# Patient Record
Sex: FEMALE | Employment: OTHER | URBAN - METROPOLITAN AREA
[De-identification: names, ages, dates, MRNs, and addresses within clinical notes are randomized per-mention and may not be internally consistent; named-entity substitution may affect disease eponyms.]

---

## 2019-02-21 ENCOUNTER — OFFICE VISIT (OUTPATIENT)
Dept: NEUROLOGY | Age: 79
End: 2019-02-21

## 2019-02-21 VITALS
DIASTOLIC BLOOD PRESSURE: 77 MMHG | RESPIRATION RATE: 18 BRPM | HEIGHT: 62 IN | OXYGEN SATURATION: 98 % | BODY MASS INDEX: 34.04 KG/M2 | WEIGHT: 185 LBS | SYSTOLIC BLOOD PRESSURE: 122 MMHG | HEART RATE: 74 BPM

## 2019-02-21 DIAGNOSIS — G62.9 NEUROPATHY: Primary | ICD-10-CM

## 2019-02-21 RX ORDER — DULOXETIN HYDROCHLORIDE 60 MG/1
60 CAPSULE, DELAYED RELEASE ORAL DAILY
COMMUNITY

## 2019-02-21 RX ORDER — LORATADINE 10 MG/1
10 TABLET ORAL
COMMUNITY

## 2019-02-21 RX ORDER — CALCIUM CARBONATE 600 MG
600 TABLET ORAL 2 TIMES DAILY
COMMUNITY

## 2019-02-21 RX ORDER — GUAIFENESIN 100 MG/5ML
81 LIQUID (ML) ORAL DAILY
COMMUNITY

## 2019-02-21 RX ORDER — GABAPENTIN 400 MG/1
400 CAPSULE ORAL 3 TIMES DAILY
COMMUNITY
End: 2019-07-22 | Stop reason: SDUPTHER

## 2019-02-21 RX ORDER — RANITIDINE 150 MG/1
150 TABLET, FILM COATED ORAL 2 TIMES DAILY
COMMUNITY

## 2019-02-21 NOTE — PROGRESS NOTES
Neurology Consult Subjective:  
  
Erick Gonsalves is a 66 y.o. female who comes in today with the following history. 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 We will attempt to get those notes as to diagnosis testing and conclusions drawn. Apparently has had numbness and pain intermittently in the feet for the better part of 10 years. Says she does not have diabetes. Vividly recalls previous EMG and nerve conduction interrogation. The sensory changes of the feet apparently come and go and she has no real history of falling per se and balance is otherwise okay. Is status post bilateral total knee replacements. Family history negative for neuropathy. Does not smoke or consume alcohol. No history of chemotherapy for cancer etc... Bowel and bladder function reveals chronic urgency of bladder. Says she has no problem with perspiration during the hot humid days of summer. Bulbar function intact. Special sensory function intact and does wears glasses. No peripheral claudication history. No low back pain history either. Her med list includes gabapentin 400 tid and cymbalta 60 mgm/day. No history of immunocompromised state and beyond osteoarthritis, no connective tissue issues. Current Outpatient Medications Medication Sig Dispense Refill  gabapentin (NEURONTIN) 400 mg capsule Take 400 mg by mouth three (3) times daily.  DULoxetine (CYMBALTA) 60 mg capsule Take 60 mg by mouth daily.  vit C/vit E/lutein/min/omega-3 (OCUVITE PO) Take  by mouth.  raNITIdine (ZANTAC) 150 mg tablet Take 150 mg by mouth two (2) times a day.  loratadine (CLARITIN) 10 mg tablet Take 10 mg by mouth.  calcium carbonate (CALTREX) 600 mg calcium (1,500 mg) tablet Take 600 mg by mouth two (2) times a day.  omega-3 fatty acids (FISH OIL CONCENTRATE PO) Take  by mouth.  aspirin 81 mg chewable tablet Take 81 mg by mouth daily. No Known Allergies Past Medical History:  
Diagnosis Date  Arthritis  Incontinence in female  Snoring Past Surgical History:  
Procedure Laterality Date  HX KNEE REPLACEMENT Social History Socioeconomic History  Marital status: UNKNOWN Spouse name: Not on file  Number of children: Not on file  Years of education: Not on file  Highest education level: Not on file Social Needs  Financial resource strain: Not on file  Food insecurity - worry: Not on file  Food insecurity - inability: Not on file  Transportation needs - medical: Not on file  Transportation needs - non-medical: Not on file Occupational History  Not on file Tobacco Use  Smoking status: Never Smoker  Smokeless tobacco: Never Used Substance and Sexual Activity  Alcohol use: No  
  Frequency: Never  Drug use: No  
 Sexual activity: Not on file Other Topics Concern  Not on file Social History Narrative  Not on file No family history on file. Visit Vitals /77 Pulse 74 Resp 18 Ht 5' 2\" (1.575 m) Wt 83.9 kg (185 lb) SpO2 98% BMI 33.84 kg/m² Review of Systems: A comprehensive review of systems was negative except for that written in the HPI. Neuro Exam:  
 
Appearance: The patient is well developed, well nourished, provides a coherent history and is in no acute distress. Mental Status: Oriented to time, place and person. Mood and affect appropriate. Cranial Nerves:   Intact visual fields. Fundi are benign. EVARISTO, EOM's full, no nystagmus, no ptosis. Facial sensation is normal. Corneal reflexes are intact. Facial movement is symmetric. Hearing is normal bilaterally. Palate is midline with normal sternocleidomastoid and trapezius muscles are normal. Tongue is midline. Motor:  5/5 strength in upper and lower proximal and distal muscles. Normal bulk and tone. No fasciculations. Reflexes:   Deep tendon reflexes 0- trace/4 and symmetrical.  
Sensory:    Diminished distally to touch, pinprick and vibration. Position sense intact Gait:  Normal gait. Tremor:   No tremor noted. Cerebellar:  No cerebellar signs present. Neurovascular:  Normal heart sounds and regular rhythm, peripheral pulses intact, and no carotid bruits. Toes downgoing no clonus no Leonardo's. Romberg negative. Assessment:  
Idiopathic polyneuropathy? We will need to get information from previous neurologist as to investigation and conclusions and treatment phases. Continue on the gabapentin and Cymbalta is a very reasonable drug that has been used for neuropathic pain as well. Revisit 6 months. Plan:  
Revisit 6 months.  
Signed by :  Miguelito Doe MD

## 2019-07-19 ENCOUNTER — TELEPHONE (OUTPATIENT)
Dept: NEUROLOGY | Age: 79
End: 2019-07-19

## 2019-07-19 NOTE — TELEPHONE ENCOUNTER
----- Message from Marianne Evans sent at 7/19/2019  3:11 PM EDT -----  Regarding: Dr. Sammie Ho from [address] of [employer] requesting recent appt notes faxed to [phone]. Best contact [phone].

## 2019-07-22 DIAGNOSIS — M79.2 NEUROPATHIC PAIN: ICD-10-CM

## 2019-07-22 DIAGNOSIS — M79.2 NEUROPATHIC PAIN: Primary | ICD-10-CM

## 2019-07-22 RX ORDER — GABAPENTIN 400 MG/1
400 CAPSULE ORAL 3 TIMES DAILY
Qty: 90 CAP | Refills: 1 | Status: SHIPPED | OUTPATIENT
Start: 2019-07-22 | End: 2019-09-27 | Stop reason: SDUPTHER

## 2019-07-22 RX ORDER — GABAPENTIN 400 MG/1
400 CAPSULE ORAL 3 TIMES DAILY
Qty: 90 CAP | Refills: 1 | Status: CANCELLED | OUTPATIENT
Start: 2019-07-22

## 2019-07-23 ENCOUNTER — TELEPHONE (OUTPATIENT)
Dept: NEUROLOGY | Age: 79
End: 2019-07-23

## 2019-07-23 NOTE — TELEPHONE ENCOUNTER
----- Message from Leeanna Shipley sent at 7/23/2019  8:44 AM EDT -----  Regarding: Dr. Joanne Villatoro  Pt requesting refill for Rx \"Gabapentin 400 mg\" sent to [facility] pharmacy on file. Best contact [phone].

## 2019-09-23 DIAGNOSIS — M79.2 NEUROPATHIC PAIN: ICD-10-CM

## 2019-09-23 RX ORDER — GABAPENTIN 400 MG/1
CAPSULE ORAL
Qty: 90 CAP | Refills: 1 | OUTPATIENT
Start: 2019-09-23

## 2019-09-27 ENCOUNTER — TELEPHONE (OUTPATIENT)
Dept: NEUROLOGY | Age: 79
End: 2019-09-27

## 2019-09-27 DIAGNOSIS — M79.2 NEUROPATHIC PAIN: ICD-10-CM

## 2019-09-27 RX ORDER — GABAPENTIN 400 MG/1
400 CAPSULE ORAL 3 TIMES DAILY
Qty: 90 CAP | Refills: 1 | OUTPATIENT
Start: 2019-09-27 | End: 2019-11-14 | Stop reason: SDUPTHER

## 2019-09-27 NOTE — TELEPHONE ENCOUNTER
----- Message from [facility] sent at 9/27/2019 11:05 AM EDT -----  Regarding: Dr. Camille Fajardo  General Message/Vendor Calls    Caller's first and last name:uEn Ndiaye      Reason for call:Rx request      Callback required yes/no and why:yes      Best contact number(s): [phone]      Details to clarify the request: Pt would like to know if her Rx(Gabapentin) has been called to [facility]([phone]) requested over a wk a go. Pt stated she is out of the medication.       [facility]

## 2019-09-27 NOTE — TELEPHONE ENCOUNTER
Gabapentin refill not recv'd. Order placed for gabapentin 400 mg, # 90, PO, per Verbal Order from Georgina Knight on 9/27/2019 due to neuropathy.

## 2019-09-27 NOTE — TELEPHONE ENCOUNTER
----- Message from [facility] sent at 9/27/2019  8:03 AM EDT -----  Regarding: Dr Michele Malloy refill  Medication Refill    Caller (if not patient): pt      Relationship of caller (if not patient):      Best contact number(s): [phone]      Name of medication and dosage if known: gabapentin      Is patient out of this medication (yes/no): yes      Pharmacy name: [facility]    Pharmacy listed in chart? (yes/no):yes  Pharmacy phone number: [phone]      Details to clarify the request: the original request was put in on Monday but a response hasnt been received       Jace Mustafa

## 2019-09-27 NOTE — TELEPHONE ENCOUNTER
----- Message from [facility] sent at 9/27/2019  8:03 AM EDT -----  Regarding: Dr Kenton Ponce refill  Medication Refill    Caller (if not patient): pt      Relationship of caller (if not patient):      Best contact number(s): [phone]      Name of medication and dosage if known: gabapentin      Is patient out of this medication (yes/no): yes      Pharmacy name: [facility]    Pharmacy listed in chart? (yes/no):yes  Pharmacy phone number: [phone]      Details to clarify the request: the original request was put in on Monday but a response hasnt been received       Jace Mustafa

## 2019-11-14 ENCOUNTER — OFFICE VISIT (OUTPATIENT)
Dept: NEUROLOGY | Age: 79
End: 2019-11-14

## 2019-11-14 VITALS
BODY MASS INDEX: 34.04 KG/M2 | OXYGEN SATURATION: 98 % | HEART RATE: 81 BPM | DIASTOLIC BLOOD PRESSURE: 83 MMHG | HEIGHT: 62 IN | WEIGHT: 185 LBS | RESPIRATION RATE: 18 BRPM | SYSTOLIC BLOOD PRESSURE: 132 MMHG

## 2019-11-14 DIAGNOSIS — G62.9 NEUROPATHY: Primary | ICD-10-CM

## 2019-11-14 DIAGNOSIS — M79.2 NEUROPATHIC PAIN: ICD-10-CM

## 2019-11-14 RX ORDER — GABAPENTIN 400 MG/1
400 CAPSULE ORAL 3 TIMES DAILY
Qty: 90 CAP | Refills: 5 | Status: SHIPPED | OUTPATIENT
Start: 2019-11-14 | End: 2020-05-19

## 2019-11-14 NOTE — LETTER
11/14/19 Patient: Iris Bay YOB: 1940 Date of Visit: 11/14/2019 Renaee Barthel, NP 
[address] [facility] [address] [facility] [address] VIA Facsimile: [phone] Dear Renaee Barthel, NP, Thank you for referring Ms. Iris Bay to [facility] for evaluation. My notes for this consultation are attached. If you have questions, please do not hesitate to call me. I look forward to following your patient along with you.  
 
 
Sincerely, 
 
Peg Nguyen MD

## 2019-11-14 NOTE — PATIENT INSTRUCTIONS
[address] [facility]   Statement to Patients  April 1, 2014      In an effort to ensure the large volume of patient prescription refills is processed in the most efficient and expeditious manner, we are asking our patients to assist us by calling your Pharmacy for all prescription refills, this will include also your  Mail Order Pharmacy. The pharmacy will contact our office electronically to continue the refill process. Please do not wait until the last minute to call your pharmacy. We need at least 48 hours (2days) to fill prescriptions. We also encourage you to call your pharmacy before going to  your prescription to make sure it is ready. With regard to controlled substance prescription refill requests (narcotic refills) that need to be picked up at our office, we ask your cooperation by providing us with at least 72 hours (3days) notice that you will need a refill. We will not refill narcotic prescription refill requests after 4:00pm on any weekday, Monday through Thursday, or after 2:00pm on Fridays, or on the weekends. We encourage everyone to explore another way of getting your prescription refill request processed using [employer], our patient web portal through our electronic medical record system. [employer] is an efficient and effective way to communicate your medication request directly to the office and  downloadable as an emily on your smart phone . [employer] also features a review functionality that allows you to view your medication list as well as leave messages for your physician. Are you ready to get connected? If so please review the attatched instructions or speak to any of our staff to get you set up right away! Thank you so much for your cooperation. Should you have any questions please contact our Practice Administrator.     The Physicians and Staff,  [facility] [address]  What is a living will?    A living will is a legal form you use to write down the kind of care you want at the end of your life. It is used by the health professionals who will treat you if you aren't able to decide for yourself. If you put your wishes in writing, your loved ones and others will know what kind of care you want. They won't need to guess. This can ease your mind and be helpful to others. A living will is not the same as an estate or property will. An estate will explains what you want to happen with your money and property after you die. Is a living will a legal document? A living will is a legal document. Each state has its own laws about living leonard. If you move to another state, make sure that your living will is legal in the state where you now live. Or you might use a universal form that has been approved by many states. This kind of form can sometimes be completed and stored online. Your electronic copy will then be available wherever you have a connection to the Internet. In most cases, doctors will respect your wishes even if you have a form from a different state. · You don't need an  to complete a living will. But legal advice can be helpful if your state's laws are unclear, your health history is complicated, or your family can't agree on what should be in your living will. · You can change your living will at any time. Some people find that their wishes about end-of-life care change as their health changes. · In addition to making a living will, think about completing a medical power of  form. This form lets you name the person you want to make end-of-life treatment decisions for you (your \"health care agent\") if you're not able to. Many hospitals and nursing homes will give you the forms you need to complete a living will and a medical power of . · Your living will is used only if you can't make or communicate decisions for yourself anymore.  If you become able to make decisions again, you can accept or refuse any treatment, no matter what you wrote in your living will. · Your state may offer an online registry. This is a place where you can store your living will online so the doctors and nurses who need to treat you can find it right away. What should you think about when creating a living will? Talk about your end-of-life wishes with your family members and your doctor. Let them know what you want. That way the people making decisions for you won't be surprised by your choices. Think about these questions as you make your living will:  · Do you know enough about life support methods that might be used? If not, talk to your doctor so you know what might be done if you can't breathe on your own, your heart stops, or you're unable to swallow. · What things would you still want to be able to do after you receive life-support methods? Would you want to be able to walk? To speak? To eat on your own? To live without the help of machines? · If you have a choice, where do you want to be cared for? In your home? At a hospital or nursing home? · Do you want certain Roman Catholic practices performed if you become very ill? · If you have a choice at the end of your life, where would you prefer to die? At home? In a hospital or nursing home? Somewhere else? · Would you prefer to be buried or cremated? · Do you want your organs to be donated after you die? What should you do with your living will? · Make sure that your family members and your health care agent have copies of your living will. · Give your doctor a copy of your living will to keep in your medical record. If you have more than one doctor, make sure that each one has a copy. · You may want to put a copy of your living will where it can be easily found. Where can you learn more? Go to http://tip-kosta.info/. Enter S776 in the search box to learn more about \"Learning About Living David. \"  Current as of: April 1, 2019  Content Version: 12.2  © 2852-3414 [employer]. Care instructions adapted under license by [employer] (which disclaims liability or warranty for this information). If you have questions about a medical condition or this instruction, always ask your healthcare professional. [address] any warranty or liability for your use of this information. This was a straightforward revisit on this patient and basically has no complaints. Pain is being covered with the gabapentin and will renew that on this visit. Went over the drug is a controlled substance since this summer deemed by the FDA. Revisit in 6 months.

## 2019-11-14 NOTE — PROGRESS NOTES
Neurology Consult      Subjective:      Iris Bay is a 66 y.o. female Who comes in today on a revisit of idiopathic painful polyneuropathy. Is on gabapentin 400 mg 3 times daily. This was renewed by request.  This past summer patient became aware of its controlled drug status and said she actually went a month without the medicine because of that she got up in its scheduling. Does not site any new difficulties and is very happy and self-sufficient. There is no history of falls although sometimes if she is not attentive she might have a slight near miss situation- to a near fall -if she is not reactive. Turned in her regular baseline exam with uniform suppression of reflexes in length dependent sensory changes. I did get a chance to review the requested neurology notes from Dr. Gala [facility] practice. It basically endorses, by process of elimination, that she has an idiopathic painful polyneuropathy. Will suggest a revisit in 6 months. Current Outpatient Medications   Medication Sig Dispense Refill    gabapentin (NEURONTIN) 400 mg capsule Take 1 Cap by mouth three (3) times daily. Max Daily Amount: 1,200 mg. Indications: Neuropathic Pain 90 Cap 5    DULoxetine (CYMBALTA) 60 mg capsule Take 60 mg by mouth daily.  vit C/vit E/lutein/min/omega-3 (OCUVITE PO) Take  by mouth.  raNITIdine (ZANTAC) 150 mg tablet Take 150 mg by mouth two (2) times a day.  loratadine (CLARITIN) 10 mg tablet Take 10 mg by mouth.  calcium carbonate (CALTREX) 600 mg calcium (1,500 mg) tablet Take 600 mg by mouth two (2) times a day.  omega-3 fatty acids (FISH OIL CONCENTRATE PO) Take  by mouth.  aspirin 81 mg chewable tablet Take 81 mg by mouth daily.         No Known Allergies  Past Medical History:   Diagnosis Date    Arthritis     Incontinence in female     Snoring       Past Surgical History:   Procedure Laterality Date    HX KNEE REPLACEMENT        Social History     Socioeconomic History    Marital status: UNKNOWN     Spouse name: Not on file    Number of children: Not on file    Years of education: Not on file    Highest education level: Not on file   Occupational History    Not on file   Social Needs    Financial resource strain: Not on file    Food insecurity:     Worry: Not on file     Inability: Not on file    Transportation needs:     Medical: Not on file     Non-medical: Not on file   Tobacco Use    Smoking status: Never Smoker    Smokeless tobacco: Never Used   Substance and Sexual Activity    Alcohol use: No     Frequency: Never    Drug use: No    Sexual activity: Not on file   Lifestyle    Physical activity:     Days per week: Not on file     Minutes per session: Not on file    Stress: Not on file   Relationships    Social connections:     Talks on phone: Not on file     Gets together: Not on file     Attends Adventism service: Not on file     Active member of club or organization: Not on file     Attends meetings of clubs or organizations: Not on file     Relationship status: Not on file    Intimate partner violence:     Fear of current or ex partner: Not on file     Emotionally abused: Not on file     Physically abused: Not on file     Forced sexual activity: Not on file   Other Topics Concern    Not on file   Social History Narrative    Not on file      No family history on file. Visit Vitals  /83   Pulse 81   Resp 18   Ht 5' 2\" (1.575 m)   Wt 83.9 kg (185 lb)   SpO2 98%   BMI 33.84 kg/m²        Review of Systems:   A comprehensive review of systems was negative except for that written in the HPI. Neuro Exam:     Appearance: The patient is well developed, well nourished, provides a coherent history and is in no acute distress. Mental Status: Oriented to time, place and person. Mood and affect appropriate. Cranial Nerves:   Intact visual fields. Fundi are benign. EVARISTO, EOM's full, no nystagmus, no ptosis. Facial sensation is normal. Corneal reflexes are intact. Facial movement is symmetric. Hearing is normal bilaterally. Palate is midline with normal sternocleidomastoid and trapezius muscles are normal. Tongue is midline. Motor:  5/5 strength in upper and lower proximal and distal muscles. Normal bulk and tone. No fasciculations. Reflexes:   Deep tendon reflexes 0-trace/4 and symmetrical.   Sensory:    Diminished distally to touch, pinprick and vibration. Gait:  Normal gait. Tremor:   No tremor noted. Cerebellar:  No cerebellar signs present. Neurovascular:  Normal heart sounds and regular rhythm, peripheral pulses intact, and no carotid bruits. Assessment:   Idiopathic painful polyneuropathy. Patient is doing well and has no complaints. Will simply renew her gabapentin 400 mg 3 times daily. Please see above dictation. Does not reference any new difficulties and we will see her back in 6 months. Seems to be functioning up to her own expectations. Plan:   Revisit 6 months.   Signed by :  Divine Wagoner MD

## 2020-05-18 DIAGNOSIS — M79.2 NEUROPATHIC PAIN: ICD-10-CM

## 2020-05-19 RX ORDER — GABAPENTIN 400 MG/1
CAPSULE ORAL
Qty: 90 CAP | Refills: 0 | Status: SHIPPED | OUTPATIENT
Start: 2020-05-19 | End: 2020-06-25

## 2020-06-25 DIAGNOSIS — M79.2 NEUROPATHIC PAIN: ICD-10-CM

## 2020-06-25 RX ORDER — GABAPENTIN 400 MG/1
CAPSULE ORAL
Qty: 90 CAP | Refills: 0 | Status: SHIPPED | OUTPATIENT
Start: 2020-06-25 | End: 2020-07-30

## 2020-09-28 DIAGNOSIS — M79.2 NEUROPATHIC PAIN: ICD-10-CM

## 2020-10-05 RX ORDER — GABAPENTIN 400 MG/1
CAPSULE ORAL
Qty: 90 CAP | Refills: 0 | Status: SHIPPED | OUTPATIENT
Start: 2020-10-05 | End: 2020-10-15 | Stop reason: SDUPTHER

## 2020-10-06 ENCOUNTER — TELEPHONE (OUTPATIENT)
Dept: NEUROLOGY | Age: 80
End: 2020-10-06

## 2020-10-06 NOTE — TELEPHONE ENCOUNTER
----- Message from Jhoana Quick sent at 10/5/2020  4:26 PM EDT -----  Regarding: Dr. Tomy Alford General Message/Vendor Calls    Caller's first and last name:      Reason for call: Regarding her refill request for Rx Gabapentin 400mg, stated she is completely out, been out since last wk.       Call back required yes/no and why: Yes       Best contact number(s): [phone]      Details to clarify the request:       Jhoana Quick

## 2020-10-15 ENCOUNTER — OFFICE VISIT (OUTPATIENT)
Dept: NEUROLOGY | Age: 80
End: 2020-10-15
Payer: MEDICARE

## 2020-10-15 VITALS
SYSTOLIC BLOOD PRESSURE: 131 MMHG | OXYGEN SATURATION: 97 % | BODY MASS INDEX: 33.84 KG/M2 | RESPIRATION RATE: 18 BRPM | TEMPERATURE: 96.8 F | HEART RATE: 76 BPM | DIASTOLIC BLOOD PRESSURE: 71 MMHG | HEIGHT: 62 IN

## 2020-10-15 DIAGNOSIS — M79.2 NEUROPATHIC PAIN: ICD-10-CM

## 2020-10-15 DIAGNOSIS — G62.9 NEUROPATHY: Primary | ICD-10-CM

## 2020-10-15 PROCEDURE — 99213 OFFICE O/P EST LOW 20 MIN: CPT | Performed by: SPECIALIST

## 2020-10-15 RX ORDER — GABAPENTIN 400 MG/1
CAPSULE ORAL
Qty: 90 CAP | Refills: 5 | Status: SHIPPED | OUTPATIENT
Start: 2020-10-15 | End: 2020-11-05 | Stop reason: SDUPTHER

## 2020-10-15 NOTE — PROGRESS NOTES
Neurology Consult      Subjective:      Jed Haque is a 78 y.o. female who comes in today with history of idiopathic painful neuropathy. Takes gabapentin for 100 mg 3 times daily and that will be renewed today. A way of recall she is also on Cymbalta 60 mg daily. That medicine probably helps the cause as well. Does not reference any new medical or surgical history and will suggest revisit in 6 months here. I thought her exam was strictly baseline. Current Outpatient Medications   Medication Sig Dispense Refill    gabapentin (NEURONTIN) 400 mg capsule TAKE 1 CAPSULE BY MOUTH THREE TIMES A DAY 90 Cap 0    DULoxetine (CYMBALTA) 60 mg capsule Take 60 mg by mouth daily.  vit C/vit E/lutein/min/omega-3 (OCUVITE PO) Take  by mouth.  loratadine (CLARITIN) 10 mg tablet Take 10 mg by mouth.  calcium carbonate (CALTREX) 600 mg calcium (1,500 mg) tablet Take 600 mg by mouth two (2) times a day.  omega-3 fatty acids (FISH OIL CONCENTRATE PO) Take  by mouth.  aspirin 81 mg chewable tablet Take 81 mg by mouth daily.  raNITIdine (ZANTAC) 150 mg tablet Take 150 mg by mouth two (2) times a day. No Known Allergies  Past Medical History:   Diagnosis Date    Arthritis     Incontinence in female     Snoring       Past Surgical History:   Procedure Laterality Date    HX KNEE REPLACEMENT        Social History     Socioeconomic History    Marital status: UNKNOWN     Spouse name: Not on file    Number of children: Not on file    Years of education: Not on file    Highest education level: Not on file   Occupational History    Not on file   Social Needs    Financial resource strain: Not on file    Food insecurity     Worry: Not on file     Inability: Not on file    Transportation needs     Medical: Not on file     Non-medical: Not on file   Tobacco Use    Smoking status: Never Smoker    Smokeless tobacco: Never Used   Substance and Sexual Activity    Alcohol use:  No Frequency: Never    Drug use: No    Sexual activity: Not on file   Lifestyle    Physical activity     Days per week: Not on file     Minutes per session: Not on file    Stress: Not on file   Relationships    Social connections     Talks on phone: Not on file     Gets together: Not on file     Attends Quaker service: Not on file     Active member of club or organization: Not on file     Attends meetings of clubs or organizations: Not on file     Relationship status: Not on file    Intimate partner violence     Fear of current or ex partner: Not on file     Emotionally abused: Not on file     Physically abused: Not on file     Forced sexual activity: Not on file   Other Topics Concern    Not on file   Social History Narrative    Not on file      No family history on file. Visit Vitals  /71   Pulse 76   Temp 96.8 °F (36 °C) (Temporal)   Resp 18   Ht 5' 2\" (1.575 m)   SpO2 97%   BMI 33.84 kg/m²        Review of Systems:   A comprehensive review of systems was negative except for that written in the HPI. Neuro Exam:     Appearance: The patient is well developed, well nourished, provides a coherent history and is in no acute distress. Mental Status: Oriented to time, place and person. Mood and affect appropriate. Cranial Nerves:   Intact visual fields. Fundi are benign. EVARISTO, EOM's full, no nystagmus, no ptosis. Facial sensation is normal. Corneal reflexes are intact. Facial movement is symmetric. Hearing is normal bilaterally. Palate is midline with normal sternocleidomastoid and trapezius muscles are normal. Tongue is midline. Motor:  5/5 strength in upper and lower proximal and distal muscles. Normal bulk and tone. No fasciculations. Reflexes:   Deep tendon reflexes 0-2+/4 and symmetrical.  Has bilateral artificial knees and that and the ankle jerks are unobtainable   Sensory:    Diminished distally to touch, pinprick and vibration. Gait:  Normal gait. Tremor:   No tremor noted. Cerebellar:  No cerebellar signs present. Neurovascular:  Normal heart sounds and regular rhythm, peripheral pulses intact, and no carotid bruits. Assessment:   Idiopathic painful neuropathy. Will dispense gabapentin 400 mg 3 times daily as needed. We will see her back in 6 months. I see no change in the history or exam segments. Plan:   Revisit 6 months.   Signed by :  Brijesh Dowd MD

## 2020-10-15 NOTE — PATIENT INSTRUCTIONS
Patient history reviewed patient examined. Will renew gabapentin by request and suggest a revisit in 6 months.

## 2020-11-05 DIAGNOSIS — M79.2 NEUROPATHIC PAIN: ICD-10-CM

## 2020-11-06 RX ORDER — GABAPENTIN 400 MG/1
CAPSULE ORAL
Qty: 90 CAP | Refills: 5 | Status: SHIPPED | OUTPATIENT
Start: 2020-11-06 | End: 2021-05-15

## 2020-11-19 ENCOUNTER — OFFICE VISIT (OUTPATIENT)
Dept: NEUROLOGY | Age: 80
End: 2020-11-19
Payer: MEDICARE

## 2020-11-19 VITALS
HEIGHT: 62 IN | OXYGEN SATURATION: 97 % | RESPIRATION RATE: 18 BRPM | TEMPERATURE: 97.1 F | HEART RATE: 71 BPM | BODY MASS INDEX: 33.84 KG/M2 | SYSTOLIC BLOOD PRESSURE: 135 MMHG | DIASTOLIC BLOOD PRESSURE: 75 MMHG

## 2020-11-19 DIAGNOSIS — R41.3 MEMORY CHANGES: Primary | ICD-10-CM

## 2020-11-19 PROCEDURE — G8510 SCR DEP NEG, NO PLAN REQD: HCPCS | Performed by: SPECIALIST

## 2020-11-19 PROCEDURE — 99213 OFFICE O/P EST LOW 20 MIN: CPT | Performed by: SPECIALIST

## 2020-11-19 PROCEDURE — G8536 NO DOC ELDER MAL SCRN: HCPCS | Performed by: SPECIALIST

## 2020-11-19 PROCEDURE — G8417 CALC BMI ABV UP PARAM F/U: HCPCS | Performed by: SPECIALIST

## 2020-11-19 PROCEDURE — 1090F PRES/ABSN URINE INCON ASSESS: CPT | Performed by: SPECIALIST

## 2020-11-19 PROCEDURE — G8400 PT W/DXA NO RESULTS DOC: HCPCS | Performed by: SPECIALIST

## 2020-11-19 PROCEDURE — G8427 DOCREV CUR MEDS BY ELIG CLIN: HCPCS | Performed by: SPECIALIST

## 2020-11-19 PROCEDURE — 1101F PT FALLS ASSESS-DOCD LE1/YR: CPT | Performed by: SPECIALIST

## 2020-11-19 NOTE — PROGRESS NOTES
Neurology Consult      Subjective:      Maverick Toledo is a 78 y.o. female who comes in today and was just recently seen for idiopathic painful neuropathy. By way of recall she is on gabapentin and Cymbalta for suppression of pain. Came in today because she has some collective concerns about her memory performance. What I mostly here is she has retention of memories with her  and some guilty response with her only son in town and having to default to him with his medical problems and his wife as well. I think she is stressed that she does not want to overburden anyone and I think this is magnified with the social isolation and the coronavirus and this is the holiday season as well. Reassured her that I think she has at her baseline performance today in terms of cognition and I do not think we need to push that agenda along in terms of formal testing. She is more than welcome to write down issues of concern as to date circumstances and relevance and if she notices some trending she certainly welcome to come back sooner than her appointed revisit date with me. I think she will do well and told her that when emotions mixed with memory performance is usually the emotions that win out. Keep scheduled revisit. Current Outpatient Medications   Medication Sig Dispense Refill    gabapentin (NEURONTIN) 400 mg capsule TAKE 1 CAPSULE BY MOUTH THREE TIMES A DAY  Indications: neuropathic pain 90 Cap 5    DULoxetine (CYMBALTA) 60 mg capsule Take 60 mg by mouth daily.  vit C/vit E/lutein/min/omega-3 (OCUVITE PO) Take  by mouth.  raNITIdine (ZANTAC) 150 mg tablet Take 150 mg by mouth two (2) times a day.  loratadine (CLARITIN) 10 mg tablet Take 10 mg by mouth.  calcium carbonate (CALTREX) 600 mg calcium (1,500 mg) tablet Take 600 mg by mouth two (2) times a day.  omega-3 fatty acids (FISH OIL CONCENTRATE PO) Take  by mouth.  aspirin 81 mg chewable tablet Take 81 mg by mouth daily. No Known Allergies  Past Medical History:   Diagnosis Date    Arthritis     Incontinence in female     Snoring       Past Surgical History:   Procedure Laterality Date    HX KNEE REPLACEMENT        Social History     Socioeconomic History    Marital status: UNKNOWN     Spouse name: Not on file    Number of children: Not on file    Years of education: Not on file    Highest education level: Not on file   Occupational History    Not on file   Social Needs    Financial resource strain: Not on file    Food insecurity     Worry: Not on file     Inability: Not on file    Transportation needs     Medical: Not on file     Non-medical: Not on file   Tobacco Use    Smoking status: Never Smoker    Smokeless tobacco: Never Used   Substance and Sexual Activity    Alcohol use: No     Frequency: Never    Drug use: No    Sexual activity: Not on file   Lifestyle    Physical activity     Days per week: Not on file     Minutes per session: Not on file    Stress: Not on file   Relationships    Social connections     Talks on phone: Not on file     Gets together: Not on file     Attends Scientologist service: Not on file     Active member of club or organization: Not on file     Attends meetings of clubs or organizations: Not on file     Relationship status: Not on file    Intimate partner violence     Fear of current or ex partner: Not on file     Emotionally abused: Not on file     Physically abused: Not on file     Forced sexual activity: Not on file   Other Topics Concern    Not on file   Social History Narrative    Not on file      No family history on file. Visit Vitals  /75   Pulse 71   Temp 97.1 °F (36.2 °C) (Temporal)   Resp 18   Ht 5' 2\" (1.575 m)   SpO2 97%   BMI 33.84 kg/m²        Review of Systems:   A comprehensive review of systems was negative except for that written in the HPI. Neuro Exam:     Appearance:   The patient is well developed, well nourished, provides a coherent history and is in no acute distress. Mental Status: Oriented to time, place and person. Mood and affect appropriate. Cranial Nerves:   Intact visual fields. Fundi are benign. EVARISTO, EOM's full, no nystagmus, no ptosis. Facial sensation is normal. Corneal reflexes are intact. Facial movement is symmetric. Hearing is normal bilaterally. Palate is midline with normal sternocleidomastoid and trapezius muscles are normal. Tongue is midline. Motor:  5/5 strength in upper and lower proximal and distal muscles. Normal bulk and tone. No fasciculations. Reflexes:   Deep tendon reflexes 0-2+/4 and symmetrical.  Has artificial knees bilaterally and absent ankle jerks   Sensory:    Diminished distally to touch, pinprick and vibration. Gait:  Normal gait. Tremor:   No tremor noted. Cerebellar:  No cerebellar signs present. Neurovascular:  Normal heart sounds and regular rhythm, peripheral pulses intact, and no carotid bruits. Assessment:   Subjective memory concerns. After listening to the story dictated above I think she has got a little bit of interference between previous and more recent stress and tension that is building. It may also be a product of the season with the holidays and with the coronavirus limitations and precautions that adds to social isolation. Urged her to keep track of any memory concerns she has as to the date and the circumstances and see if there is any trending. Would suggest she keep a revisit with me in the near future or sooner if she feels things are trending uncomfortably. I thought she was at her baseline today otherwise. Plan:   Revisit as already on schedule.   Signed by :  Leyla Gurrola MD

## 2020-11-19 NOTE — PATIENT INSTRUCTIONS
Patient history reviewed patient examined. Through discussion today patient was reassured that I think her memory function may be competes with some emotional issues and other things going on both recent and past memories etc. should keep her next revisit with me and if she is concerned would recommend writing down the events the dates and the circumstances to see if there is any sort of trending that is going on I could always see me sooner. Stay safe, happy holidays.

## 2020-11-19 NOTE — LETTER
11/19/20 Patient: Deedee Beltran YOB: 1940 Date of Visit: 11/19/2020 Varun Cuellar NP 
[address] [facility] [address] [facility] [address] VIA Facsimile: [phone] Varun Cuellar NP 
[place]  [address] [facility] [address] VIA In Basket Dear MICHELLE Lee NP, Thank you for referring Ms. Deedee Beltran to [address] [place] [address] for evaluation. My notes for this consultation are attached. If you have questions, please do not hesitate to call me. I look forward to following your patient along with you.  
 
 
Sincerely, 
 
Jacqueline Logan MD

## 2021-01-12 ENCOUNTER — TELEPHONE (OUTPATIENT)
Dept: NEUROLOGY | Age: 81
End: 2021-01-12

## 2021-01-12 NOTE — TELEPHONE ENCOUNTER
----- Message from Libby Calix sent at 1/12/2021 11:46 AM EST -----  Regarding:  JENNIFER/TELEPHONE  Contact: [phone]  General Message/Vendor Calls    Caller's first and last name: Rosetta Dancer      Reason for call: Request a callback from Dr. Renzo Agrawal required yes/no and why: Yes      Best contact number(s): [phone]      Details to clarify the request: Patient requesting a callback from Dr. Basil Tomlin (No information provided)      Libby Calix

## 2021-01-12 NOTE — TELEPHONE ENCOUNTER
Patient states that she is concerned about dementia and would like to pursue treatment.    Dr. Reyes Pretty made aware of request

## 2021-01-13 DIAGNOSIS — R41.3 MEMORY IMPAIRMENT: Primary | ICD-10-CM

## 2021-01-13 DIAGNOSIS — R41.3 MEMORY IMPAIRMENT: ICD-10-CM

## 2021-02-12 ENCOUNTER — TELEPHONE (OUTPATIENT)
Dept: NEUROLOGY | Age: 81
End: 2021-02-12

## 2021-02-12 NOTE — TELEPHONE ENCOUNTER
----- Message from Janny Valencia sent at 2/12/2021  8:50 AM EST -----  Regarding: Dr. Jose Umanzor telephone  General Message/Vendor Calls    Caller's first and last name:      Reason for call: EMG appointment       Callback required yes/no and why: yes       Best contact number(s): [phone]      Details to clarify the request: pt is needing to schedule her EMG appointment.  She said she was told it has to be done in April and she is needing two tests       Janny Valencia

## 2021-02-15 NOTE — TELEPHONE ENCOUNTER
Called and spoke to patient she states she called for appt with Dr Monica John  And that has been scheduled

## 2021-04-15 ENCOUNTER — OFFICE VISIT (OUTPATIENT)
Dept: NEUROLOGY | Age: 81
End: 2021-04-15
Payer: MEDICARE

## 2021-04-15 DIAGNOSIS — R41.3 SHORT-TERM MEMORY LOSS: ICD-10-CM

## 2021-04-15 DIAGNOSIS — R47.89 WORD FINDING DIFFICULTY: ICD-10-CM

## 2021-04-15 DIAGNOSIS — F41.1 GENERALIZED ANXIETY DISORDER: ICD-10-CM

## 2021-04-15 DIAGNOSIS — G31.84 MILD COGNITIVE IMPAIRMENT: Primary | ICD-10-CM

## 2021-04-15 PROCEDURE — 90791 PSYCH DIAGNOSTIC EVALUATION: CPT | Performed by: CLINICAL NEUROPSYCHOLOGIST

## 2021-04-15 NOTE — PROGRESS NOTES
[address] [facility],[address]  [address]   [place] [address] [place] [address] [address]   [phone] Office   [phone] Fax      Neuropsychology    Initial Diagnostic Interview Note      Referral:  Clydell Scheuermann, NP    Uvaldo Choudhury is a [de-identified] y.o. right handed  (passed away four years ago)  female who was accompanied by her friend to the initial clinical interview on 4/15/21. Please refer to her medical records for details pertaining to her history. At the start of the appointment, I reviewed the patient's [facility] Epic Chart (including Media scanned in from previous providers) for the active Problem List, all pertinent Past Medical Hx, medications, recent radiologic and laboratory findings. In addition, I reviewed pt's documented Immunization Record and Encounter History. She completed the 12th grade without history of previously diagnosed LD and/or receipt or special education services. 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 She has noticed problems with short term memory worsening over the past couple of months. She is doing things that are messing her up. Friend has known her for 20 years and of late has noticed the patient has increased forgetful. Forgets new information. Forgets the content of conversations. Misplaces things. She has cognitive decline. At least year and getting worse. She did see Dr. Ashleigh Nieto and he referred her for evaluation here. She leaves lights on. Loses words. Cabinet doors left open. Has not left the stove. No problems with driving. She does her own medications and finances and does her own ADLs. She has stress, and social isolation due to the coronavirus. Her son and DIl come over quite a bit and help with things around the house, and friend says they come over a lot to help.   She has no known stroke, meningitis/encephalitis, HONORIO Fever, Lupus, Lyme, TBI, sz, etc.  She got a new cell phone and struggling to remember how to use it. Sleep is fine. Appetite is okay. She has replaced her knees 20 years ago. No falls. No acute or focal issues. Anxiety, but not depression. No counseling or psychiatrist.  [place] has everyone depressed, for sure. When [facility] was closed, it was especially hard. Back to [place] now, so mood better. [place] is a big part of her life. Brother has passed away. Sister is living and has dementia. She has left car running to go to Sunday school. No previous neuropsych. CT head done in 2019. MRI Brain pending    Neuropsychological Mental Status Exam (NMSE):    Historian: Good  Praxis: No UE apraxia  R/L Orientation: Intact to self and to other  Dress: within normal limits   Weight: Overweight  Appearance/Hygiene: within normal limits   Gait: within normal limits   Assistive Devices: Glasses  Mood: within normal limits   Affect: within normal limits   Comprehension: within normal limits   Thought Process: within normal limits   Expressive Language: within normal limits   Receptive Language: within normal limits   Motor:  No cognitive or motor perseveration  ETOH: Denied  Tobacco: Denied  Illicit: Denied  SI/HI: Denied  Psychosis: Denied  Insight: Within normal limits  Judgment: Within normal limits  Other Psych:      Past Medical History:   Diagnosis Date    Arthritis     Incontinence in female     Snoring        Past Surgical History:   Procedure Laterality Date    HX KNEE REPLACEMENT         No Known Allergies    No family history on file.     Social History     Tobacco Use    Smoking status: Never Smoker    Smokeless tobacco: Never Used   Substance Use Topics    Alcohol use: No     Frequency: Never    Drug use: No       Current Outpatient Medications   Medication Sig Dispense Refill    gabapentin (NEURONTIN) 400 mg capsule TAKE 1 CAPSULE BY MOUTH THREE TIMES A DAY  Indications: neuropathic pain 90 Cap 5    DULoxetine (CYMBALTA) 60 mg capsule Take 60 mg by mouth daily.  vit C/vit E/lutein/min/omega-3 (OCUVITE PO) Take  by mouth.  raNITIdine (ZANTAC) 150 mg tablet Take 150 mg by mouth two (2) times a day.  loratadine (CLARITIN) 10 mg tablet Take 10 mg by mouth.  calcium carbonate (CALTREX) 600 mg calcium (1,500 mg) tablet Take 600 mg by mouth two (2) times a day.  omega-3 fatty acids (FISH OIL CONCENTRATE PO) Take  by mouth.  aspirin 81 mg chewable tablet Take 81 mg by mouth daily. Plan:  Obtain authorization for testing from insurance company. Report to follow once testing, scoring, and interpretation completed. ? Organic based neurocognitive issues versus mood disorder or combination of same. ? Problems organic, functional, or both? This note will not be viewable in [address]

## 2021-05-04 ENCOUNTER — OFFICE VISIT (OUTPATIENT)
Dept: NEUROLOGY | Age: 81
End: 2021-05-04
Payer: MEDICARE

## 2021-05-04 DIAGNOSIS — G30.1 LATE ONSET ALZHEIMER'S DEMENTIA WITHOUT BEHAVIORAL DISTURBANCE (HCC): Primary | ICD-10-CM

## 2021-05-04 DIAGNOSIS — F43.21 ADJUSTMENT DISORDER WITH DEPRESSED MOOD: ICD-10-CM

## 2021-05-04 DIAGNOSIS — F02.80 LATE ONSET ALZHEIMER'S DEMENTIA WITHOUT BEHAVIORAL DISTURBANCE (HCC): Primary | ICD-10-CM

## 2021-05-04 DIAGNOSIS — F41.1 GENERALIZED ANXIETY DISORDER: ICD-10-CM

## 2021-05-04 PROCEDURE — 96133 NRPSYC TST EVAL PHYS/QHP EA: CPT | Performed by: CLINICAL NEUROPSYCHOLOGIST

## 2021-05-04 PROCEDURE — 96132 NRPSYC TST EVAL PHYS/QHP 1ST: CPT | Performed by: CLINICAL NEUROPSYCHOLOGIST

## 2021-05-04 PROCEDURE — 96137 PSYCL/NRPSYC TST PHY/QHP EA: CPT | Performed by: CLINICAL NEUROPSYCHOLOGIST

## 2021-05-04 PROCEDURE — 96139 PSYCL/NRPSYC TST TECH EA: CPT | Performed by: CLINICAL NEUROPSYCHOLOGIST

## 2021-05-04 PROCEDURE — 96136 PSYCL/NRPSYC TST PHY/QHP 1ST: CPT | Performed by: CLINICAL NEUROPSYCHOLOGIST

## 2021-05-04 PROCEDURE — 96138 PSYCL/NRPSYC TECH 1ST: CPT | Performed by: CLINICAL NEUROPSYCHOLOGIST

## 2021-05-04 NOTE — LETTER
5/5/2021 Patient: Uvaldo Choudhury YOB: 1940 Date of Visit: 5/4/2021 Alcides Lucia NP 
[address] [facility] [address] [facility] [address] Via Fax: [phone] Dear Alcides Lucia NP, Thank you for referring Ms. Uvaldo Choudhury to [facility] for evaluation. My notes for this consultation are attached. If you have questions, please do not hesitate to call me. I look forward to following your patient along with you. Sincerely, Nicolas Rosario PsyD

## 2021-05-05 NOTE — PROGRESS NOTES
[address] [facility],[address]  [address]   [place] [address] [facility]    [phone] Office   [phone] Fax      Neuropsychological Evaluation Report    Referral:  Fay Rubi, MICHELLE    Anup Gould is a [de-identified] y.o. right handed  (passed away four years ago)  female who was accompanied by her friend to the initial clinical interview on 4/15/21. Please refer to her medical records for details pertaining to her history. At the start of the appointment, I reviewed the patient's [facility] Epic Chart (including Media scanned in from previous providers) for the active Problem List, all pertinent Past Medical Hx, medications, recent radiologic and laboratory findings. In addition, I reviewed pt's documented Immunization Record and Encounter History. She completed the 12th grade without history of previously diagnosed LD and/or receipt or special education services. She lives alone, is retired, and worked in the [employer] department of the [employer] at [employer]. She has noticed problems with short term memory worsening over the past couple of months. She is doing things that are messing her up. Friend has known her for 20 years and of late has noticed the patient has increased forgetful. Forgets new information. Forgets the content of conversations. Misplaces things. She has cognitive decline. At least year and getting worse. She did see Dr. Akash Puentes and he referred her for evaluation here. She leaves lights on. Loses words. Cabinet doors left open. Has not left the stove. No problems with driving. She does her own medications and finances and does her own ADLs. She has stress, and social isolation due to the coronavirus. Her son and DIl come over quite a bit and help with things around the house, and friend says they come over a lot to help.   She has no known stroke, meningitis/encephalitis, HONORIO Fever, Lupus, Lyme, TBI, sz, etc.  She got a new cell phone and struggling to remember how to use it. Sleep is fine. Appetite is okay. She has replaced her knees 20 years ago. No falls. No acute or focal issues. Anxiety, but not depression. No counseling or psychiatrist.  Jese has everyone depressed, for sure. When [employer] was closed, it was especially hard. Back to [employer] now, so mood better. Nettie is a big part of her life. Brother has passed away. Sister is living and has dementia. She has left car running to go to Sunday school. No previous neuropsych. CT head done in 2019. MRI Brain pending    Neuropsychological Mental Status Exam (NMSE):    Historian: Good  Praxis: No UE apraxia  R/L Orientation: Intact to self and to other  Dress: within normal limits   Weight: Overweight  Appearance/Hygiene: within normal limits   Gait: within normal limits   Assistive Devices: Glasses  Mood: within normal limits   Affect: within normal limits   Comprehension: within normal limits   Thought Process: within normal limits   Expressive Language: within normal limits   Receptive Language: within normal limits   Motor:  No cognitive or motor perseveration  ETOH: Denied  Tobacco: Denied  Illicit: Denied  SI/HI: Denied  Psychosis: Denied  Insight: Within normal limits  Judgment: Within normal limits  Other Psych:      Past Medical History:   Diagnosis Date    Arthritis     Incontinence in female     Snoring        Past Surgical History:   Procedure Laterality Date    HX KNEE REPLACEMENT         No Known Allergies    No family history on file.     Social History     Tobacco Use    Smoking status: Never Smoker    Smokeless tobacco: Never Used   Substance Use Topics    Alcohol use: No     Frequency: Never    Drug use: No       Current Outpatient Medications   Medication Sig Dispense Refill    gabapentin (NEURONTIN) 400 mg capsule TAKE 1 CAPSULE BY MOUTH THREE TIMES A DAY  Indications: neuropathic pain 90 Cap 5    DULoxetine (CYMBALTA) 60 mg capsule Take 60 mg by mouth daily.  vit C/vit E/lutein/min/omega-3 (OCUVITE PO) Take  by mouth.  raNITIdine (ZANTAC) 150 mg tablet Take 150 mg by mouth two (2) times a day.  loratadine (CLARITIN) 10 mg tablet Take 10 mg by mouth.  calcium carbonate (CALTREX) 600 mg calcium (1,500 mg) tablet Take 600 mg by mouth two (2) times a day.  omega-3 fatty acids (FISH OIL CONCENTRATE PO) Take  by mouth.  aspirin 81 mg chewable tablet Take 81 mg by mouth daily. Plan:  Obtain authorization for testing from insurance company. Report to follow once testing, scoring, and interpretation completed. ? Organic based neurocognitive issues versus mood disorder or combination of same. ? Problems organic, functional, or both? This note will not be viewable in [address] Neuropsychological Test Results  Patient Testing 5/4/21 Report Completed 5/5/21  A Psychometrist Assisted w/ portions of this evaluation while under my direct  supervision    The following evaluation procedures/tests were administered:      Neuropsychologist Performed, Interpreted, & Reported:  Neuropsychological Mental Status Exam, Revised Memory & Behavior Checklist,  Mini Mental Status Exam, Clock Drawing Test, Nat-Melzack Pain Questionnaire, Test Of Premorbid Functioning, History Taking  & Clinical Interview With The Patient, Additional History Taking w/ the Patient's Friends, LIZA, CPT-III, Review Of Available Records. Psychometrist Administered under Neuropsychologist Supervision & Neuropsychologist Interpreted & Neuropsychologist Reported:  Verbal Fluency Tests, Tomas & Tomas  Revised, Trailmaking Test Parts A & B, Wechsler Adult Intelligence Scale - IV, [employer]  3, Grooved Pegboard, Gomez Depression Inventory  II, Gomez Anxiety Inventory.    Test Findings:  Test Findings:  Note:  The patients raw data have been compared with currently available norms which include demographic corrections for age, gender, and/or education. Sometimes, the patients scores are compared to demographically similar individuals as close to the patients age, education level, etc., as possible. \"Average\" is viewed as being +/- 1 standard deviation (SD) from the stated mean for a particular test score. \"Low average\" is viewed as being between 1 and 2 SD below the mean, and above average is viewed as being 1 and 2 SD above the mean. Scores falling in the borderline range (between 1-1/2 and 2 SD below the mean) are viewed with particular attention as to whether they are normal or abnormal neurocognitive test scores. Other methods of inference in analyzing the test data are also utilized, including the pattern and range of scores in the profile, bilateral motor functions, and the presence, if any, of pathognomonic signs. Behaviorally, the patient was friendly and cooperative and appeared motivated to perform well during this examination. Within this context, the results of this evaluation are viewed as a valid reflection of the patients actual neurocognitive and emotional status. The patient's score of 27/30 on the Mini-Mental Status Exam was normal.  In this regard, she was not oriented to county. Recall for three words after a brief delay was 1/3 correct. Clock drawing was impaired. Her structured word list fluency, as assessed by the FAS Test, was within the mildly to moderately impaired range with a T score of 33. Category fluency was within the moderately impaired range with a T score 26. Confrontation naming, as assessed by the [facility] as revised, was within the moderately impaired range with a T score 28. This pattern of performance is indicative of patient is at increased risk for day-to-day problems with verbal fluency and confrontation naming.      The patient was administered the [facility] Continuous Performance Test  III and review of the subscales within this instrument revealed numerous concerns for inattentiveness without impulsivity. This pattern of performance is indicative of a patient who is at increased risk for day-to-day problems with sustained visual attention/concentration. The attention problem is moderate to severe. The patient is not showing problems with working memory capacity (9th %ile) or processing speed (34th %ile) on the WAIS-IV. Her Verbal Comprehension Index score of 81 was within the low average range. Her Perceptual Reasoning Index score of 94 was average. These scores do not reflect a major decline in functioning based on an assessment of premorbid functioning. The patient was administered the [place] Verbal Learning Test  - 3 and generated an impaired range (and positive) learning curve over five repeated auditory word list learning trials. An interference trial was impaired. Free and cued, short and long delayed recall were all impaired. Recognition and forced choice recall were normal.  This pattern of performance is indicative of a patient who is at increased risk for day-to-day problems with auditory learning and/or memory. Simple timed visual motor sequencing (Trailmaking Test Part A) was within the mildly impaired range with a T score of 39. Her performance on a similar, but more complex task of timed visual motor sequencing (Trailmaking Test Part B) was within the below average range with a T score of 40. She made only 1 sequencing error on this latter completed test.  Taken together, this pattern of performance is not strongly indicative of a patient who is at increased risk for day-to-day problems with executive functioning. Fine motor dexterity was within the moderately impaired range bilaterally  This does not raise concern for a particularly lateralized brain dysfunction.        The patient rated her current level of pain as \"2/5- Discomforting\" on the Nat-Melzack Pain Questionnaire. She reported pain in her right buttock, right thigh and right knee. Her Gomez Depression Inventory II score of 7 was within the minimally depressed range. Her Gomez Anxiety Inventory score of 9 reflected mild anxiety. The patient's responses on the Personality Assessment Inventory were deemed valid for interpretation, though some defensiveness in responding is noted. Within this context, she can be submissive, conforming, and perhaps naïve at times. She will tend to feel helpless and overwhelmed under relatively mild pressure and may dependently seek the assistance of others. Her concerns about offending other people may potentially provide situations where others could try and take advantage. Her self-reported level of treatment motivation is low. Impressions & Recommendations: This is an abnormal range Neuropsychological Evaluation with respect to neurocognitive functioning. In this regard, she is showing impairments with verbal fluency, confrontation naming, sustained attention, bilateral fine motor dexterity, auditory learning, and auditory memory. The most severe deficit relates to her inability to sustain focus/concentration. The actual memory problem itself is mild. From an emotional standpoint, there is mild anxiety. Her performances across all other neurocognitive domains assessed are fully within the normal range. This includes normal range executive functioning, verbal comprehension, perceptual reasoning, working memory, and processing speed. In my opinion, this appears to be a case of mild dementia exacerbated by mild anxiety  I suggest consideration for medication for memory, attention, and depression. Counseling may prove helpful as well. She should be encouraged to remain as mentally, socially, and physically active as possible.   I am not currently concerned about competency/capacity, but she does need day-to-day supervision for household safety (in part due to her marked problems with attention and focus), medications, and finances. Family already assist with same. I am concerned about the effect that marked attention problems also have upon her driving safety and thus suggest a formal evaluation of same. This issue has been caught early on, and I hope that she recognizes this is positive. The patient should be encouraged to remain as mentally, physically, and socially active as possible. Baseline now established. Follow up yearly, or prn. Clinical correlation is, of course, indicated. I will discuss these findings with the patient when she follows up with me in the near future. A follow up Neuropsychological Evaluation is indicated on a prn basis, especially if there are any cognitive and/or emotional changes. DIAGNOSES:  Dementia - Mild     Anxiety - Mild     The above information is based upon information currently available to me. If there is any additional information of which I am currently unaware, I would be more than happy to review it upon having it made available to me. Thank you for the opportunity to see this interesting individual.     Sincerely,       Pretty [place]. Viet Ragland PsyD, EdS    CC:  Johnny Regan NP    Time Documentation:    70112*9 39181*0 (60 minutes)    16221 x 1  96139 x 5 Test Administration/Data Gathering By Technician: (3 hours). 67954 x 1 (first 30 minutes), 74644 x 5 (each additional 30 minutes)    96132 x 1  96133 x 1 Testing Evaluation Services by Neuropsychologist (1 hour, 50 minutes) 96132 x 1 (first hour), 96133 x 1 (50 minutes)    Definitions:      01280/10133:  Neurobehavioral Status Exam, Clinical interview.   Clinical assessment of thinking, reasoning and judgment, by neuropsychologist, both face to face time with patient and time interpreting those test results and reporting, first and subsequent hours)    43113/36095: Neuropsychological Test Administration by Technician/Psychometrist, first 30 minutes and each additional 30 minutes. The above includes: Record review. Review of history provided by patient. Review of collaborative information. Testing by Clinician. Review of raw data. Scoring. Report writing of individual tests administered by Clinician. Integration of individual tests administered by psychometrist with NSE/testing by clinician, review of records/history/collaborative information, case Conceptualization, treatment planning, clinical decision making, report writing, coordination Of Care. Psychometry test codes as time spent by psychometrist administering and scoring neurocognitive/psychological tests under supervision of neuropsychologist.  Integral services including scoring of raw data, data interpretation, case conceptualization, report writing etcetera were initiated after the patient finished testing/raw data collected and was completed on the date the report was signed. Please note that this dictation was completed with [employer] the [employer] voice recognition software. Quite often unanticipated grammatical, syntax, homophones, and other interpretive errors are inadvertently transcribed by the computer software. Please disregard these errors. Please excuse any errors that have escaped final proofreading. Thank you.

## 2021-06-07 ENCOUNTER — TELEPHONE (OUTPATIENT)
Dept: ENT CLINIC | Age: 81
End: 2021-06-07

## 2021-06-15 ENCOUNTER — OFFICE VISIT (OUTPATIENT)
Dept: NEUROLOGY | Age: 81
End: 2021-06-15
Payer: MEDICARE

## 2021-06-15 DIAGNOSIS — F02.80 LATE ONSET ALZHEIMER'S DEMENTIA WITHOUT BEHAVIORAL DISTURBANCE (HCC): Primary | ICD-10-CM

## 2021-06-15 DIAGNOSIS — G30.1 LATE ONSET ALZHEIMER'S DEMENTIA WITHOUT BEHAVIORAL DISTURBANCE (HCC): Primary | ICD-10-CM

## 2021-06-15 DIAGNOSIS — F41.1 GENERALIZED ANXIETY DISORDER: ICD-10-CM

## 2021-06-15 PROCEDURE — 90847 FAMILY PSYTX W/PT 50 MIN: CPT | Performed by: CLINICAL NEUROPSYCHOLOGIST

## 2021-06-15 NOTE — PROGRESS NOTES
Prior to seeing the patient I reviewed the records, including the previously completed report, the records in [place], and any updated visits from other providers since I saw the patient last.      Today, I engaged in a psychoeducational and supportive and cognitive/behavioral psychotherapy session with the patient and son. I provided psychotherapy in the form of psychoeducation and support with respect to the results of the recent Neuropsychological Evaluation, including discussing individual tests as well as patient's areas of neurocognitive strength versus weakness. We discussed, in detail, the following: This is an abnormal range Neuropsychological Evaluation with respect to neurocognitive functioning. In this regard, she is showing impairments with verbal fluency, confrontation naming, sustained attention, bilateral fine motor dexterity, auditory learning, and auditory memory. The most severe deficit relates to her inability to sustain focus/concentration. The actual memory problem itself is mild. From an emotional standpoint, there is mild anxiety. Her performances across all other neurocognitive domains assessed are fully within the normal range. This includes normal range executive functioning, verbal comprehension, perceptual reasoning, working memory, and processing speed.                 In my opinion, this appears to be a case of mild dementia exacerbated by mild anxiety  I suggest consideration for medication for memory, attention, and depression. Counseling may prove helpful as well. She should be encouraged to remain as mentally, socially, and physically active as possible. I am not currently concerned about competency/capacity, but she does need day-to-day supervision for household safety (in part due to her marked problems with attention and focus), medications, and finances. Family already assist with same.   I am concerned about the effect that marked attention problems also have upon her driving safety and thus suggest a formal evaluation of same. This issue has been caught early on, and I hope that she recognizes this is positive. The patient should be encouraged to remain as mentally, physically, and socially active as possible. Baseline now established. Follow up yearly, or prn. Clinical correlation is, of course, indicated. I will discuss these findings with the patient when she follows up with me in the near future. A follow up Neuropsychological Evaluation is indicated on a prn basis, especially if there are any cognitive and/or emotional changes.       DIAGNOSES:             Dementia - Mild                                      Anxiety - Mild       Education was provided regarding my diagnostic impressions, and we discussed treatment plan/options. I also answered numerous questions related to the clinical findings, including discussing various methods to improve cognition and mood. Counseling provided regarding mood and cognition. CBT and supportive psychotherapy techniques were utilized. Supportive/Cognitive Behavioral/Solution Focused psychotherapy provided  Discussed rational versus irrational thinking patterns and their consequences. Discussed healthy/adaptive and unhealthy/maladaptive coping. The patient needs to follow with neuro, pcp    Specific areas which were addressed include: memory loss, dementia as mild, anxiety.      The patient had the following concerns which I deferred to their referring provider: meds      Time spent today:  22

## 2021-06-17 ENCOUNTER — OFFICE VISIT (OUTPATIENT)
Dept: NEUROLOGY | Age: 81
End: 2021-06-17
Payer: MEDICARE

## 2021-06-17 VITALS
OXYGEN SATURATION: 100 % | SYSTOLIC BLOOD PRESSURE: 128 MMHG | RESPIRATION RATE: 16 BRPM | WEIGHT: 174 LBS | HEIGHT: 62 IN | DIASTOLIC BLOOD PRESSURE: 78 MMHG | HEART RATE: 71 BPM | BODY MASS INDEX: 32.02 KG/M2

## 2021-06-17 DIAGNOSIS — F03.90 DEMENTIA WITHOUT BEHAVIORAL DISTURBANCE, UNSPECIFIED DEMENTIA TYPE: Primary | ICD-10-CM

## 2021-06-17 PROCEDURE — G8427 DOCREV CUR MEDS BY ELIG CLIN: HCPCS | Performed by: SPECIALIST

## 2021-06-17 PROCEDURE — G8417 CALC BMI ABV UP PARAM F/U: HCPCS | Performed by: SPECIALIST

## 2021-06-17 PROCEDURE — G8400 PT W/DXA NO RESULTS DOC: HCPCS | Performed by: SPECIALIST

## 2021-06-17 PROCEDURE — G8536 NO DOC ELDER MAL SCRN: HCPCS | Performed by: SPECIALIST

## 2021-06-17 PROCEDURE — 1101F PT FALLS ASSESS-DOCD LE1/YR: CPT | Performed by: SPECIALIST

## 2021-06-17 PROCEDURE — 1090F PRES/ABSN URINE INCON ASSESS: CPT | Performed by: SPECIALIST

## 2021-06-17 PROCEDURE — G8510 SCR DEP NEG, NO PLAN REQD: HCPCS | Performed by: SPECIALIST

## 2021-06-17 PROCEDURE — 99214 OFFICE O/P EST MOD 30 MIN: CPT | Performed by: SPECIALIST

## 2021-06-17 RX ORDER — DONEPEZIL HYDROCHLORIDE 5 MG/1
5 TABLET, FILM COATED ORAL DAILY
Qty: 30 TABLET | Refills: 0 | Status: SHIPPED | OUTPATIENT
Start: 2021-06-17 | End: 2021-06-20

## 2021-06-17 RX ORDER — DONEPEZIL HYDROCHLORIDE 10 MG/1
10 TABLET, FILM COATED ORAL
Qty: 30 TABLET | Refills: 5 | Status: SHIPPED | OUTPATIENT
Start: 2021-06-17 | End: 2021-11-29 | Stop reason: SDUPTHER

## 2021-06-17 NOTE — LETTER
6/19/2021 Patient: Anup Gould YOB: 1940 Date of Visit: 6/17/2021 Beau Pratt NP 
[address] [facility] [address] [address] Fax: [phone] Beau Pratt NP 
[place]  [address] [facility] [address] [address] [place] [employer] Dear MICHELLE Weinberg NP, Thank you for referring Ms. Anup Gould to [facility] for evaluation. My notes for this consultation are attached. If you have questions, please do not hesitate to call me. I look forward to following your patient along with you.  
 
 
Sincerely, 
 
Victor Manuel Esparza MD

## 2021-06-17 NOTE — PROGRESS NOTES
Neurology Consult      Subjective:      Louis Cam is a [de-identified] y.o. female who comes in today with her son. Went over neuropsych test results which included mild dementia enhanced by mild anxiety. Suggestions went to a memory medicine and we talked about the definition of dementia. The we talked about the basis for selecting a product to support memory such as donepezil or Aricept. We will go through a titration phase and I will see her back in 2 months and see what progress she is making. Went over potential side effects as well. Talked about the driving evaluation and how it was linked to level of alertness and attention and reaction time. Suggestions that she go through [facility] as that would be much cheaper than a driving school or even an occupational therapist affiliated with the hospital.  Is already on Cymbalta which has good working relations toward depression and anxiety. Suggestions to follow-up as needed or within a year for neuropsych assessment and there was a reference to an attention medicine but I think we need to take care of the memory issue initially and see where that progress goes. Again revisit 2 months. Current Outpatient Medications   Medication Sig Dispense Refill    donepeziL (Aricept) 5 mg tablet Take 1 Tablet by mouth daily. 30 Tablet 0    donepeziL (ARICEPT) 10 mg tablet Take 1 Tablet by mouth nightly. To begin after the 5 mg dose is completed. 30 Tablet 5    gabapentin (NEURONTIN) 400 mg capsule TAKE 1 CAPSULE BY MOUTH THREE TIMES A DAY FOR NEUROPATHIC PAIN 90 Cap 1    DULoxetine (CYMBALTA) 60 mg capsule Take 60 mg by mouth daily.  vit C/vit E/lutein/min/omega-3 (OCUVITE PO) Take  by mouth.  loratadine (CLARITIN) 10 mg tablet Take 10 mg by mouth.  calcium carbonate (CALTREX) 600 mg calcium (1,500 mg) tablet Take 600 mg by mouth two (2) times a day.  omega-3 fatty acids (FISH OIL CONCENTRATE PO) Take  by mouth.       aspirin 81 mg chewable tablet Take 81 mg by mouth daily.  raNITIdine (ZANTAC) 150 mg tablet Take 150 mg by mouth two (2) times a day. (Patient not taking: Reported on 6/17/2021)        No Known Allergies  Past Medical History:   Diagnosis Date    Arthritis     Incontinence in female     Snoring       Past Surgical History:   Procedure Laterality Date    HX KNEE REPLACEMENT        Social History     Socioeconomic History    Marital status: UNKNOWN     Spouse name: Not on file    Number of children: Not on file    Years of education: Not on file    Highest education level: Not on file   Occupational History    Not on file   Tobacco Use    Smoking status: Never Smoker    Smokeless tobacco: Never Used   Substance and Sexual Activity    Alcohol use: No    Drug use: No    Sexual activity: Not on file   Other Topics Concern    Not on file   Social History Narrative    Not on file     Social Determinants of Health     Financial Resource Strain:     Difficulty of Paying Living Expenses:    Food Insecurity:     Worried About Running Out of Food in the Last Year:     [address] in the Last Year:    Transportation Needs:     Lack of Transportation (Medical):  Lack of Transportation (Non-Medical):    Physical Activity:     Days of Exercise per Week:     Minutes of Exercise per Session:    Stress:     Feeling of Stress :    Social Connections:     Frequency of Communication with Friends and Family:     Frequency of Social Gatherings with Friends and Family:     Attends Mandaen Services:     Active Member of Clubs or Organizations:     Attends Club or Organization Meetings:     Marital Status:    Intimate Partner Violence:     Fear of Current or Ex-Partner:     Emotionally Abused:     Physically Abused:     Sexually Abused:       No family history on file.    Visit Vitals  /78 (BP 1 Location: Left upper arm, BP Patient Position: Sitting)   Pulse 71   Resp 16   Ht 5' 2\" (1.575 m)   Wt 78.9 kg (174 lb)   SpO2 100%   BMI 31.83 kg/m²        Review of Systems:   A comprehensive review of systems was negative except for that written in the HPI. Neuro Exam:     Appearance: The patient is well developed, well nourished, provides a partial coherent history and is in no acute distress. Mental Status: Oriented to time, place and person. Mood and affect appropriate. Cranial Nerves:   Intact visual fields. Fundi are benign. EVARISTO, EOM's full, no nystagmus, no ptosis. Facial sensation is normal. Corneal reflexes are intact. Facial movement is symmetric. Hearing is normal bilaterally. Palate is midline with normal sternocleidomastoid and trapezius muscles are normal. Tongue is midline. Motor:  5/5 strength in upper and lower proximal and distal muscles. Normal bulk and tone. No fasciculations. Reflexes:   Deep tendon reflexes 2+/4 and symmetrical.   Sensory:   Normal to touch, pinprick and vibration. Gait:  Normal gait. Tremor:   No tremor noted. Cerebellar:  No cerebellar signs present. Neurovascular:  Normal heart sounds and regular rhythm, peripheral pulses intact, and no carotid bruits. Assessment:   Mild dementia enhanced by mild anxiety. Went over results of neuropsych testing please see above. We will see back in 2 months at which time hopefully she will have successfully matriculated through a starter dose and a maintenance dose on the Aricept. Suggestions that she have DMV or similar agency evaluate her driving proficiency. Stay as reasonably and safely busy as possible. Plan:   Revisit 2 months.   Signed by :  Fili Hess MD

## 2021-06-17 NOTE — PATIENT INSTRUCTIONS
History reviewed patient examined. Went over recent neuropsychological test results and will move on to introducing the memory support drug donepezil. Will be a 5 mg starter dose for 30 days superseded by the 10 mg dose as maintenance. Suggestions to have her driving formally evaluated at [facility]. We will see her back in 2 months and see how she is doing. Stays reasonably safely busy as possible.

## 2021-06-20 RX ORDER — DONEPEZIL HYDROCHLORIDE 5 MG/1
TABLET, FILM COATED ORAL
Qty: 30 TABLET | Refills: 0 | Status: SHIPPED | OUTPATIENT
Start: 2021-06-20 | End: 2021-08-04

## 2021-06-28 ENCOUNTER — OFFICE VISIT (OUTPATIENT)
Dept: ENT CLINIC | Age: 81
End: 2021-06-28
Payer: MEDICARE

## 2021-06-28 VITALS
BODY MASS INDEX: 31.47 KG/M2 | SYSTOLIC BLOOD PRESSURE: 122 MMHG | OXYGEN SATURATION: 97 % | DIASTOLIC BLOOD PRESSURE: 80 MMHG | TEMPERATURE: 96.9 F | WEIGHT: 171 LBS | RESPIRATION RATE: 17 BRPM | HEIGHT: 62 IN | HEART RATE: 77 BPM

## 2021-06-28 DIAGNOSIS — C44.321 SQUAMOUS CELL CANCER OF SKIN OF NOSE: Primary | ICD-10-CM

## 2021-06-28 DIAGNOSIS — H90.3 SENSORINEURAL HEARING LOSS (SNHL) OF BOTH EARS: ICD-10-CM

## 2021-06-28 PROBLEM — C44.320 SQUAMOUS CELL CARCINOMA OF SKIN OF FACE: Status: ACTIVE | Noted: 2021-06-28

## 2021-06-28 PROCEDURE — G8427 DOCREV CUR MEDS BY ELIG CLIN: HCPCS | Performed by: OTOLARYNGOLOGY

## 2021-06-28 PROCEDURE — G8400 PT W/DXA NO RESULTS DOC: HCPCS | Performed by: OTOLARYNGOLOGY

## 2021-06-28 PROCEDURE — G8417 CALC BMI ABV UP PARAM F/U: HCPCS | Performed by: OTOLARYNGOLOGY

## 2021-06-28 PROCEDURE — 1101F PT FALLS ASSESS-DOCD LE1/YR: CPT | Performed by: OTOLARYNGOLOGY

## 2021-06-28 PROCEDURE — G8510 SCR DEP NEG, NO PLAN REQD: HCPCS | Performed by: OTOLARYNGOLOGY

## 2021-06-28 PROCEDURE — G8536 NO DOC ELDER MAL SCRN: HCPCS | Performed by: OTOLARYNGOLOGY

## 2021-06-28 PROCEDURE — 99203 OFFICE O/P NEW LOW 30 MIN: CPT | Performed by: OTOLARYNGOLOGY

## 2021-06-28 PROCEDURE — 1090F PRES/ABSN URINE INCON ASSESS: CPT | Performed by: OTOLARYNGOLOGY

## 2021-06-28 NOTE — LETTER
6/28/2021    Patient: Teresa Briggs   YOB: 1940   Date of Visit: 6/28/2021     Aracelis Hernandez NP  [address] [facility] [address] [address]  Via Fax: [phone]     Ivory Liz MD  [address] [place] [address] [address]  [phone]  Via Fax: [phone]    Dear MICHELLE López MD,      Thank you for referring Ms. Teresa Briggs to [facility] EAR NOSE AND THROAT [address], THROAT AND ALLERGY CARE for evaluation. My notes for this consultation are attached. If you have questions, please do not hesitate to call me. I look forward to following your patient along with you.       Sincerely,    Sarah Guillory MD Spoke with patient and he is having increased paranoid thoughts. Denies suicidal or homicidal ideation. Sent in Quetiapine and discontinued Abilify. Instructed him to call next Monday to report response. If symptoms worsen pt agrees to go to the ER. Message completed.

## 2021-06-28 NOTE — PROGRESS NOTES
Visit Vitals  /80 (BP 1 Location: Left upper arm, BP Patient Position: Sitting, BP Cuff Size: Adult)   Pulse 77   Temp 96.9 °F (36.1 °C) (Temporal)   Resp 17   Ht 5' 2\" (1.575 m)   Wt 171 lb (77.6 kg)   SpO2 97%   BMI 31.28 kg/m²     Chief Complaint   Patient presents with    New Patient     skin cancer consult

## 2021-06-28 NOTE — PROGRESS NOTES
Subjective:    Reba Domingo   [de-identified] y.o.   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 HENT: Positive for hearing loss. Negative for ear pain, nosebleeds and tinnitus. Eyes: Negative for blurred vision and double vision. Respiratory: Negative for cough, sputum production and shortness of breath. Cardiovascular: Negative for chest pain and palpitations. Gastrointestinal: Negative for heartburn, nausea and vomiting. Musculoskeletal: Positive for joint pain and myalgias. Negative for neck pain. Skin: Negative. Neurological: Negative for dizziness, speech change, weakness and headaches. Endo/Heme/Allergies: Negative for environmental allergies. Does not bruise/bleed easily. Psychiatric/Behavioral: Negative for memory loss. The patient does not have insomnia. Past Medical History:   Diagnosis Date    Arthritis     Incontinence in female     Snoring      Past Surgical History:   Procedure Laterality Date    HX KNEE REPLACEMENT        History reviewed. No pertinent family history. Social History     Tobacco Use    Smoking status: Never Smoker    Smokeless tobacco: Never Used   Substance Use Topics    Alcohol use: No      Prior to Admission medications    Medication Sig Start Date End Date Taking? Authorizing Provider   donepeziL (ARICEPT) 5 mg tablet TAKE 1 TABLET BY MOUTH EVERY DAY 6/20/21   Lexus Romero MD   donepeziL (ARICEPT) 10 mg tablet Take 1 Tablet by mouth nightly. To begin after the 5 mg dose is completed.  6/17/21   Lexus Rmoero MD   gabapentin (NEURONTIN) 400 mg capsule TAKE 1 CAPSULE BY MOUTH THREE TIMES A DAY FOR NEUROPATHIC PAIN 5/15/21   Princess Wilder MD   DULoxetine (CYMBALTA) 60 mg capsule Take 60 mg by mouth daily. Provider, Historical   vit C/vit E/lutein/min/omega-3 (OCUVITE PO) Take  by mouth. Provider, Historical   raNITIdine (ZANTAC) 150 mg tablet Take 150 mg by mouth two (2) times a day. Patient not taking: Reported on 6/17/2021    Provider, Historical   loratadine (CLARITIN) 10 mg tablet Take 10 mg by mouth. Provider, Historical   calcium carbonate (CALTREX) 600 mg calcium (1,500 mg) tablet Take 600 mg by mouth two (2) times a day. Provider, Historical   omega-3 fatty acids (FISH OIL CONCENTRATE PO) Take  by mouth. Provider, Historical   aspirin 81 mg chewable tablet Take 81 mg by mouth daily. Provider, Historical        No Known Allergies      Objective:     Visit Vitals  /80 (BP 1 Location: Left upper arm, BP Patient Position: Sitting, BP Cuff Size: Adult)   Pulse 77   Temp 96.9 °F (36.1 °C) (Temporal)   Resp 17   Ht 5' 2\" (1.575 m)   Wt 171 lb (77.6 kg)   SpO2 97%   BMI 31.28 kg/m²        Physical Exam  Vitals reviewed. Constitutional:       General: She is awake. She is not in acute distress. Appearance: Normal appearance. She is well-groomed. She is obese. HENT:      Head: Normocephalic and atraumatic. Jaw: There is normal jaw occlusion. No trismus, tenderness or malocclusion. Salivary Glands: Right salivary gland is not diffusely enlarged or tender. Left salivary gland is not diffusely enlarged or tender. Right Ear: Tympanic membrane, ear canal and external ear normal. Decreased hearing noted. Left Ear: Tympanic membrane, ear canal and external ear normal. Decreased hearing noted. Ears:      Adams exam findings: does not lateralize. Right Rinne: AC > BC. Left Rinne: AC > BC. Comments: Hearing aids present     Nose: No nasal deformity, septal deviation or mucosal edema.       Right Nostril: No epistaxis. Left Nostril: No epistaxis. Right Turbinates: Not enlarged, swollen or pale. Left Turbinates: Not enlarged, swollen or pale. Right Sinus: No maxillary sinus tenderness or frontal sinus tenderness. Left Sinus: No maxillary sinus tenderness or frontal sinus tenderness. Comments: 2 to 3 mm area of fibrosis right upper nasal sidewall lateral to the medial canthus    There is some mild pre-existing medial canthal webbing     Mouth/Throat:      Lips: Pink. No lesions. Mouth: Mucous membranes are moist. No oral lesions. Dentition: Normal dentition. No dental caries. Tongue: No lesions. Palate: No mass and lesions. Pharynx: Oropharynx is clear. Uvula midline. No oropharyngeal exudate or posterior oropharyngeal erythema. Tonsils: No tonsillar exudate. 0 on the right. 0 on the left. Eyes:      General: Vision grossly intact. Extraocular Movements: Extraocular movements intact. Right eye: No nystagmus. Left eye: No nystagmus. Conjunctiva/sclera: Conjunctivae normal.      Pupils: Pupils are equal, round, and reactive to light. Neck:      Thyroid: No thyroid mass, thyromegaly or thyroid tenderness. Trachea: Trachea and phonation normal. No tracheal tenderness or tracheal deviation. Cardiovascular:      Rate and Rhythm: Normal rate and regular rhythm. Pulmonary:      Effort: Pulmonary effort is normal. No respiratory distress. Breath sounds: No stridor. Musculoskeletal:         General: No swelling or tenderness. Normal range of motion. Cervical back: No edema or erythema. Lymphadenopathy:      Cervical: No cervical adenopathy. Skin:     General: Skin is warm and dry. Findings: No lesion or rash. Neurological:      General: No focal deficit present. Mental Status: She is alert and oriented to person, place, and time. Mental status is at baseline.       Cranial Nerves: Cranial nerves are intact. Coordination: Romberg sign negative. Gait: Gait is intact. Psychiatric:         Mood and Affect: Mood normal.         Behavior: Behavior normal. Behavior is cooperative. Assessment/Plan:     Encounter Diagnoses   Name Primary?  Squamous cell cancer of skin of nose Yes    Sensorineural hearing loss (SNHL) of both ears      Dermatology notes/reports reviewed with patient. Discussed office excision in slow Mohs technique with frozen section. Discussed reconstruction including primary closure, rotational flap, skin grafting, and secondary intention. Questions answered. She can stay on her 81 mg aspirin. She is scheduled for August 3 in the office. I explained that in the medial canthal region oftentimes healing by secondary intention is preferable than primary closure due to risk of webbing. If defect seems too large for secondary intention then I would do a flap. No orders of the defined types were placed in this encounter. Thank you for referring this patient,    Emanuel Cotto MD, [address] [facility]  [address] [place] [address] [address]  [facility] [phone]

## 2021-07-30 DIAGNOSIS — M79.2 NEUROPATHIC PAIN: ICD-10-CM

## 2021-07-30 RX ORDER — GABAPENTIN 400 MG/1
CAPSULE ORAL
Qty: 90 CAPSULE | Refills: 1 | Status: SHIPPED | OUTPATIENT
Start: 2021-07-30 | End: 2021-10-05

## 2021-08-04 RX ORDER — DONEPEZIL HYDROCHLORIDE 5 MG/1
TABLET, FILM COATED ORAL
Qty: 30 TABLET | Refills: 0 | Status: SHIPPED | OUTPATIENT
Start: 2021-08-04 | End: 2021-08-30 | Stop reason: DRUGHIGH

## 2021-08-06 ENCOUNTER — TRANSCRIBE ORDER (OUTPATIENT)
Dept: LAB | Age: 81
End: 2021-08-06

## 2021-08-06 ENCOUNTER — HOSPITAL ENCOUNTER (OUTPATIENT)
Dept: LAB | Age: 81
Discharge: HOME | End: 2021-08-06
Payer: MEDICARE

## 2021-08-06 ENCOUNTER — OFFICE VISIT (OUTPATIENT)
Dept: ENT CLINIC | Age: 81
End: 2021-08-06
Payer: MEDICARE

## 2021-08-06 VITALS
HEART RATE: 74 BPM | OXYGEN SATURATION: 98 % | SYSTOLIC BLOOD PRESSURE: 120 MMHG | HEIGHT: 62 IN | BODY MASS INDEX: 31.47 KG/M2 | WEIGHT: 171 LBS | DIASTOLIC BLOOD PRESSURE: 80 MMHG | RESPIRATION RATE: 18 BRPM | TEMPERATURE: 97.5 F

## 2021-08-06 DIAGNOSIS — C44.321 SQUAMOUS CELL CARCINOMA OF NOSE: Primary | ICD-10-CM

## 2021-08-06 DIAGNOSIS — C44.321 SQUAMOUS CELL CANCER OF SKIN OF NOSE: Primary | ICD-10-CM

## 2021-08-06 PROCEDURE — 14060 TIS TRNFR E/N/E/L 10 SQ CM/<: CPT | Performed by: OTOLARYNGOLOGY

## 2021-08-06 PROCEDURE — 88305 TISSUE EXAM BY PATHOLOGIST: CPT

## 2021-08-06 PROCEDURE — 88331 PATH CONSLTJ SURG 1 BLK 1SPC: CPT

## 2021-08-06 PROCEDURE — 11641 EXC F/E/E/N/L MAL+MRG 0.6-1: CPT | Performed by: OTOLARYNGOLOGY

## 2021-08-06 NOTE — PROGRESS NOTES
Excision Malignant Neoplasm of Skin with Intermediate Closure    Excision squamous cell carcinoma, right lateral nasal wall skin, 1 cm  Rotational flap repair, nose, CPT 51663    Informed consent was obtained. The area surrounding the skin lesion was cleansed with alcohol then injected with  1.5 mL of 1% lidocaine with 1:100,000 parts epinephrine. We then prepped the area with betadine and draped in sterile fashion. A 15 blade was used to incise the skin surrounding the lesion with gross margins, down to the subcutaneous level. Scalpel and curved scissors were used to complete the excision. The specimen is suture marked for pathologic orientation and sent for frozen section. Hemostasis was achieved with bipolar cautery and a temporary pressure dressing was applied. Frozen section revealed negative margins. The excision/defect is 1 cm    We closed the defect with a bilobed flap. Additional anesthetic is injected. Bilobed flap was fashioned inferiorly based rotating skin from the lateral nasal wall. Flap was raised sharply and hemostasis was achieved. I then rotated the flap superiorly into the defect. Flap was then inset in layers using interrupted 4-0 vicryl deep suture, and a running 5-0 fast absorbing plain gut for the skin. The wound was cleaned and Steri-strips and sterile pressure dressing was applied.

## 2021-08-06 NOTE — PROGRESS NOTES
Visit Vitals  /80 (BP 1 Location: Right upper arm, BP Patient Position: Sitting)   Pulse 74   Temp 97.5 °F (36.4 °C) (Temporal)   Resp 18   Ht 5' 2\" (1.575 m)   Wt 171 lb (77.6 kg)   SpO2 98%   BMI 31.28 kg/m²     Chief Complaint   Patient presents with    Procedure     skin cancer excision

## 2021-08-10 ENCOUNTER — TELEPHONE (OUTPATIENT)
Dept: NEUROLOGY | Age: 81
End: 2021-08-10

## 2021-08-10 NOTE — TELEPHONE ENCOUNTER
----- Message from Wilder Coleman sent at 8/9/2021  2:03 PM EDT -----  Regarding: Dr. Prashanth Prabhakar  Medication Refill    Caller (if not patient):      Relationship of caller (if not patient):      Best contact number(s):[phone]      Name of medication and dosage if known: Donepezil       Is patient out of this medication (yes/no): Yes       Pharmacy name: [facility]    Pharmacy listed in chart? (yes/no): Yes  Pharmacy phone number:      Details to clarify the request:      Wilder Coleman

## 2021-08-18 ENCOUNTER — OFFICE VISIT (OUTPATIENT)
Dept: ENT CLINIC | Age: 81
End: 2021-08-18
Payer: MEDICARE

## 2021-08-18 VITALS
RESPIRATION RATE: 18 BRPM | OXYGEN SATURATION: 97 % | SYSTOLIC BLOOD PRESSURE: 122 MMHG | TEMPERATURE: 98.7 F | DIASTOLIC BLOOD PRESSURE: 78 MMHG | WEIGHT: 171 LBS | HEART RATE: 76 BPM | HEIGHT: 62 IN | BODY MASS INDEX: 31.47 KG/M2

## 2021-08-18 DIAGNOSIS — C44.321 SQUAMOUS CELL CANCER OF SKIN OF NOSE: Primary | ICD-10-CM

## 2021-08-18 PROCEDURE — 99024 POSTOP FOLLOW-UP VISIT: CPT | Performed by: OTOLARYNGOLOGY

## 2021-08-18 NOTE — PROGRESS NOTES
12-day follow-up from nasal skin cancer excision with flap reconstruction. Exam is showing flap is inset nicely there is no flap necrosis. There is mild to moderate flap edema    There is one extruding deep suture which is removed. Ointment is applied. Pathology report is showing no residual cancer with negative margin.    ==========================================================================   * * *FINAL PATHOLOGIC DIAGNOSIS* * *   Right lateral nasal wall, skin excision:        Nodular dermal scar.          No evidence of residual malignancy. *Electronically Signed Out By Billy Miller MD*         A/P - healing well s/p flap recon. Further care discussed. Cont followup with dermatology. followup as needed with me.

## 2021-08-18 NOTE — PROGRESS NOTES
Visit Vitals  /78 (BP 1 Location: Left upper arm, BP Patient Position: Sitting, BP Cuff Size: Adult)   Pulse 76   Temp 98.7 °F (37.1 °C) (Temporal)   Resp 18   Ht 5' 2\" (1.575 m)   Wt 171 lb (77.6 kg)   SpO2 97%   BMI 31.28 kg/m²     Chief Complaint   Patient presents with    Follow-up     wound check

## 2021-08-18 NOTE — LETTER
8/18/2021    Patient: Gurvinder Alarcon   YOB: 1940   Date of Visit: 8/18/2021     Rhett Snyder NP  [address] [facility]  [address] [place] [address]  Via Fax: [phone]     Dermatology [place] [address]  [address] [facility] [address] [facility] [phone]  Via Fax: [phone]    Dear Rhett Snyder NP  Dermatology [place] [address],      Thank you for referring Ms. Gurvinder Alarcon to [facility] EAR NOSE AND THROAT [address], THROAT AND ALLERGY CARE for evaluation. My notes for this consultation are attached. If you have questions, please do not hesitate to call me. I look forward to following your patient along with you.       Sincerely,    Mone Fuentes MD

## 2021-08-19 ENCOUNTER — OFFICE VISIT (OUTPATIENT)
Dept: NEUROLOGY | Age: 81
End: 2021-08-19
Payer: MEDICARE

## 2021-08-19 VITALS
BODY MASS INDEX: 31.83 KG/M2 | HEIGHT: 62 IN | WEIGHT: 173 LBS | OXYGEN SATURATION: 97 % | SYSTOLIC BLOOD PRESSURE: 134 MMHG | DIASTOLIC BLOOD PRESSURE: 72 MMHG | HEART RATE: 84 BPM | RESPIRATION RATE: 16 BRPM

## 2021-08-19 DIAGNOSIS — F03.90 DEMENTIA WITHOUT BEHAVIORAL DISTURBANCE, UNSPECIFIED DEMENTIA TYPE: Primary | ICD-10-CM

## 2021-08-19 PROCEDURE — 99214 OFFICE O/P EST MOD 30 MIN: CPT | Performed by: SPECIALIST

## 2021-08-19 PROCEDURE — 1101F PT FALLS ASSESS-DOCD LE1/YR: CPT | Performed by: SPECIALIST

## 2021-08-19 PROCEDURE — G8417 CALC BMI ABV UP PARAM F/U: HCPCS | Performed by: SPECIALIST

## 2021-08-19 PROCEDURE — G8432 DEP SCR NOT DOC, RNG: HCPCS | Performed by: SPECIALIST

## 2021-08-19 PROCEDURE — G8400 PT W/DXA NO RESULTS DOC: HCPCS | Performed by: SPECIALIST

## 2021-08-19 PROCEDURE — G8427 DOCREV CUR MEDS BY ELIG CLIN: HCPCS | Performed by: SPECIALIST

## 2021-08-19 PROCEDURE — G8536 NO DOC ELDER MAL SCRN: HCPCS | Performed by: SPECIALIST

## 2021-08-19 PROCEDURE — 1090F PRES/ABSN URINE INCON ASSESS: CPT | Performed by: SPECIALIST

## 2021-08-19 RX ORDER — KETOROLAC TROMETHAMINE 5 MG/ML
SOLUTION OPHTHALMIC
COMMUNITY

## 2021-08-19 RX ORDER — OFLOXACIN 3 MG/ML
SOLUTION/ DROPS OPHTHALMIC
COMMUNITY

## 2021-08-19 NOTE — LETTER
8/19/2021    Patient: Elpidio Escalera   YOB: 1940   Date of Visit: 8/19/2021     Rossy Nava NP  [address] [place] [address] [address] [address]  Via Fax: [phone]    Dear Rossy Nava NP,      Thank you for referring Ms. Elpidio Escalera to [facility] for evaluation. My notes for this consultation are attached. If you have questions, please do not hesitate to call me. I look forward to following your patient along with you.       Sincerely,    Glenn Linares MD

## 2021-08-19 NOTE — PROGRESS NOTES
Sandra Marie  Identified pt with two pt identifiers(name and ). Chief Complaint   Patient presents with    Follow-up     dementia//     Fall     1 month ago broke L arm        1. Have you been to the ER, urgent care clinic since your last visit? Hospitalized since your last visit? NO    2. Have you seen or consulted any other health care providers outside of the [address] [place] [address] since your last visit? Yes, ortho- broke arm r/t fall 1 month ago      Provider notified of reason for visit, vitals and flowsheets obtained on patients.      Patient received paperwork for advance directive during previous visit but has not completed at this time     Reviewed record In preparation for visit, huddled with provider and have obtained necessary documentation      Health Maintenance Due   Topic    DTaP/Tdap/Td series (1 - Tdap)    Shingrix Vaccine Age 49> (1 of 2)    Bone Densitometry (Dexa) Screening     Medicare Yearly Exam        Wt Readings from Last 3 Encounters:   21 78.5 kg (173 lb)   21 77.6 kg (171 lb)   21 77.6 kg (171 lb)     Temp Readings from Last 3 Encounters:   21 98.7 °F (37.1 °C) (Temporal)   21 97.5 °F (36.4 °C) (Temporal)   21 96.9 °F (36.1 °C) (Temporal)     BP Readings from Last 3 Encounters:   21 134/72   21 122/78   21 120/80     Pulse Readings from Last 3 Encounters:   21 84   21 76   21 74     Vitals:    21 1042   BP: 134/72   Pulse: 84   Resp: 16   SpO2: 97%   Weight: 78.5 kg (173 lb)   Height: 5' 2\" (1.575 m)   PainSc:   5   PainLoc: Arm         Learning Assessment:  :     Learning Assessment 2019   PRIMARY LEARNER Patient   BARRIERS PRIMARY LEARNER NONE   CO-LEARNER CAREGIVER No   PRIMARY LANGUAGE ENGLISH   LEARNER PREFERENCE PRIMARY READING   ANSWERED BY Randal Gautam   RELATIONSHIP SELF       Depression Screening:  :     3 most recent PHQ Screens 2021   PHQ Not Done Patient Decline   Little interest or pleasure in doing things -   Feeling down, depressed, irritable, or hopeless -   Total Score PHQ 2 -       Fall Risk Assessment:  :     Fall Risk Assessment, last 12 mths 8/19/2021   Able to walk? Yes   Fall in past 12 months? 1   Do you feel unsteady? 0   Are you worried about falling 0   Is the gait abnormal? 0   Fall with injury? 1       Abuse Screening:  :     No flowsheet data found. ADL Screening:  :     No flowsheet data found. Medication reconciliation up to date and corrected with patient at this time.

## 2021-08-19 NOTE — PROGRESS NOTES
Neurology Consult      Subjective:      Justin Luo is a [de-identified] y.o. female who comes in today with her son. Slowly matriculated to the 10 mg of Aricept and no downside. Took an unfortunate fall on her deck and I did not quite understand how that materialized but she is currently in a left forearm brace and they are watching the swelling. Stays busy at [employer] with delegated responsibilities and keeps up with the women as well on a regular basis. Attends to her household activities and currently has no problem. Has a sterile left so the steps do not represent a problem. Eating and sleeping are fine as is mood and behavior. Is going to go to [facility] to take the road test at our request based on her neuropsych profile. Cheap suggestion would be to get the brace out of the way so it is not a hindrance or distraction. Is eventually planning on a trip to [facility] in concert with her son and again I think they determine they want to get this brace out of the way before that happens. Seems to be very happy overall and we can eventually talk about the addition of the drug Namenda if and as it materialize is. Again is on Cymbalta so that helps he depression element. Did not appear to be anxious or depressed today and I think she is also happy with her lifestyle and activity level. Revisit in 3 months and depending on how she is doing we could expand the revisit timeframe accordingly. Did not reference any new medical surgical history apart from the fall on the deck. Current Outpatient Medications   Medication Sig Dispense Refill    ketorolac (ACULAR) 0.5 % ophthalmic solution ketorolac 0.5 % eye drops      ofloxacin (FLOXIN) 0.3 % ophthalmic solution ofloxacin 0.3 % eye drops      gabapentin (NEURONTIN) 400 mg capsule TAKE 1 CAPSULE BY MOUTH THREE TIMES A DAY FOR NEUROPATHIC PAIN 90 Capsule 1    donepeziL (ARICEPT) 10 mg tablet Take 1 Tablet by mouth nightly. To begin after the 5 mg dose is completed.  27 Tablet 5    DULoxetine (CYMBALTA) 60 mg capsule Take 60 mg by mouth daily.  vit C/vit E/lutein/min/omega-3 (OCUVITE PO) Take  by mouth.  raNITIdine (ZANTAC) 150 mg tablet Take 150 mg by mouth two (2) times a day.  loratadine (CLARITIN) 10 mg tablet Take 10 mg by mouth.  calcium carbonate (CALTREX) 600 mg calcium (1,500 mg) tablet Take 600 mg by mouth two (2) times a day.  omega-3 fatty acids (FISH OIL CONCENTRATE PO) Take  by mouth.  aspirin 81 mg chewable tablet Take 81 mg by mouth daily.  donepeziL (ARICEPT) 5 mg tablet TAKE 1 TABLET BY MOUTH EVERY DAY 30 Tablet 0      No Known Allergies  Past Medical History:   Diagnosis Date    Arthritis     Incontinence in female     Snoring       Past Surgical History:   Procedure Laterality Date    HX KNEE REPLACEMENT        Social History     Socioeconomic History    Marital status: UNKNOWN     Spouse name: Not on file    Number of children: Not on file    Years of education: Not on file    Highest education level: Not on file   Occupational History    Not on file   Tobacco Use    Smoking status: Never Smoker    Smokeless tobacco: Never Used   Substance and Sexual Activity    Alcohol use: No    Drug use: No    Sexual activity: Not on file   Other Topics Concern    Not on file   Social History Narrative    Not on file     Social Determinants of Health     Financial Resource Strain:     Difficulty of Paying Living Expenses:    Food Insecurity:     Worried About Running Out of Food in the Last Year:     Ran Out of Food in the Last Year:    Transportation Needs:     Lack of Transportation (Medical):      Lack of Transportation (Non-Medical):    Physical Activity:     Days of Exercise per Week:     Minutes of Exercise per Session:    Stress:     Feeling of Stress :    Social Connections:     Frequency of Communication with Friends and Family:     Frequency of Social Gatherings with Friends and Family:     Attends Orthodox Services:     Active Member of Clubs or Organizations:     Attends Club or Organization Meetings:     Marital Status:    Intimate Partner Violence:     Fear of Current or Ex-Partner:     Emotionally Abused:     Physically Abused:     Sexually Abused:       No family history on file. Visit Vitals  /72   Pulse 84   Resp 16   Ht 5' 2\" (1.575 m)   Wt 78.5 kg (173 lb)   SpO2 97%   BMI 31.64 kg/m²        Review of Systems:   A comprehensive review of systems was negative except for that written in the HPI. Neuro Exam:     Appearance: The patient is well developed, well nourished, provides a coherent history and is in no acute distress. Mental Status: Oriented to day and close on the date and otherwise on spot. Knew the place and person. Mood and affect appropriate. Cranial Nerves:   Intact visual fields. Fundi are benign. EVARISTO, EOM's full, no nystagmus, no ptosis. Facial sensation is normal. Corneal reflexes are intact. Facial movement is symmetric. Hearing is normal bilaterally. Palate is midline with normal sternocleidomastoid and trapezius muscles are normal. Tongue is midline. Motor:  5/5 strength in upper and lower proximal and distal muscles. Normal bulk and tone. No fasciculations. Reflexes:   Deep tendon reflexes 2+/4 and symmetrical.   Sensory:   Normal to touch, pinprick and vibration. Gait:  Normal gait. Tremor:   No tremor noted. Cerebellar:  No cerebellar signs present. Neurovascular:  Normal heart sounds and regular rhythm, peripheral pulses intact, and no carotid bruits. Assessment:   Mild dementia without behavioral disturbance. I believe she is doing reasonably well and the fall represented a setback of course. Please see above dictation. Would recommend she keep up her socialization and interaction a [employer] and with the women as she does.   Would suggest a revisit in about 3 months to see how she is doing and then we could extend the timeframe based on her performance etc.      Plan:   Revisit 3 months.   Signed by :  Avinash Mar MD

## 2021-08-19 NOTE — PATIENT INSTRUCTIONS
Patient history reviewed patient examined. Has successfully arrived at her maintenance dose of the Aricept and no tolerability issues. Suggestions to stay as reasonably and safely busy as possible. Will be rehabbing with her left arm brace and my suggestion would be to get that out of the way before she goes to [facility] to get her road test evaluation. Otherwise I am very impressed with her interaction at [employer] and her friends and the hope that can continue. In regards to the left arm injury went over some commonsense approaches in the house and outside to hopefully avoid the next fall.

## 2021-08-27 ENCOUNTER — TELEPHONE (OUTPATIENT)
Dept: NEUROLOGY | Age: 81
End: 2021-08-27

## 2021-08-27 RX ORDER — DONEPEZIL HYDROCHLORIDE 5 MG/1
TABLET, FILM COATED ORAL
Qty: 30 TABLET | Refills: 0 | OUTPATIENT
Start: 2021-08-27

## 2021-08-27 NOTE — TELEPHONE ENCOUNTER
Should no longer be on aricept 5 mgm, should have since progressed to 10 mgm- something is wrong here.  jjhmd ahv

## 2021-08-27 NOTE — TELEPHONE ENCOUNTER
Outgoing call to [facility]- Pt has never filled the 10mg Aricept- it was on hold. Has only been filling the 5mg tablets.    Pharmacy cancelled remaining 5 mg refills and will ready the 10 mg tablets    Outgoing call to pt- VM left for pt advising

## 2021-08-27 NOTE — TELEPHONE ENCOUNTER
----- Message from [facility] sent at 8/27/2021  4:13 PM EDT -----  Regarding: Dr. Gerber Adams telephone  Patient return call    Caller's first and last name and relationship (if not the patient): n/a       Best contact number(s):[phone]        Whose call is being returned: unknown      Details to clarify the request: She would like to know the reason for the call      [facility]

## 2021-08-30 NOTE — TELEPHONE ENCOUNTER
Clarifying with pt that she should be on the 10 mg donepezil daily, NOT the 5mg. PT is aware and voiced understanding.

## 2021-08-31 ENCOUNTER — OFFICE VISIT (OUTPATIENT)
Dept: ENT CLINIC | Age: 81
End: 2021-08-31
Payer: MEDICARE

## 2021-08-31 VITALS
HEART RATE: 74 BPM | WEIGHT: 173 LBS | DIASTOLIC BLOOD PRESSURE: 80 MMHG | HEIGHT: 62 IN | RESPIRATION RATE: 18 BRPM | SYSTOLIC BLOOD PRESSURE: 130 MMHG | BODY MASS INDEX: 31.83 KG/M2 | TEMPERATURE: 98 F | OXYGEN SATURATION: 98 %

## 2021-08-31 DIAGNOSIS — L91.0 HYPERTROPHIC SCAR OF SKIN: Primary | ICD-10-CM

## 2021-08-31 PROCEDURE — 11900 INJECT SKIN LESIONS </W 7: CPT | Performed by: OTOLARYNGOLOGY

## 2021-08-31 PROCEDURE — 96372 THER/PROPH/DIAG INJ SC/IM: CPT | Performed by: OTOLARYNGOLOGY

## 2021-08-31 RX ORDER — TRIAMCINOLONE ACETONIDE 40 MG/ML
40 INJECTION, SUSPENSION INTRA-ARTICULAR; INTRAMUSCULAR ONCE
Status: COMPLETED | OUTPATIENT
Start: 2021-08-31 | End: 2021-08-31

## 2021-08-31 RX ADMIN — TRIAMCINOLONE ACETONIDE 40 MG: 40 INJECTION, SUSPENSION INTRA-ARTICULAR; INTRAMUSCULAR at 13:37

## 2021-08-31 NOTE — PROGRESS NOTES
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 Exam -     Some extruded suture is excised  No erythema nor tenderness nor discharge  Mild fibrosis, induration  Moderate pincushioning    Skin prepped with alcohol. Kenalog injection done 0.5mL into flap. Tolerated well      Pathology report is showing no residual cancer with negative margin.    ==========================================================================   * * *FINAL PATHOLOGIC DIAGNOSIS* * *   Right lateral nasal wall, skin excision:        Nodular dermal scar.          No evidence of residual malignancy. *Electronically Signed Out By Lito Sarah MD*         A/P - hypertrophic scar. Injected with kenalog today.   Fu 3 weeks

## 2021-08-31 NOTE — PROGRESS NOTES
Visit Vitals  /80 (BP 1 Location: Left upper arm, BP Patient Position: Sitting, BP Cuff Size: Adult)   Pulse 74   Temp 98 °F (36.7 °C) (Temporal)   Resp 18   Ht 5' 2\" (1.575 m)   Wt 173 lb (78.5 kg)   SpO2 98%   BMI 31.64 kg/m²     Chief Complaint   Patient presents with    Follow-up     wound check

## 2021-09-22 ENCOUNTER — OFFICE VISIT (OUTPATIENT)
Dept: ENT CLINIC | Age: 81
End: 2021-09-22
Payer: MEDICARE

## 2021-09-22 VITALS
TEMPERATURE: 97.8 F | HEART RATE: 69 BPM | WEIGHT: 173 LBS | HEIGHT: 62 IN | OXYGEN SATURATION: 98 % | SYSTOLIC BLOOD PRESSURE: 120 MMHG | RESPIRATION RATE: 18 BRPM | DIASTOLIC BLOOD PRESSURE: 74 MMHG | BODY MASS INDEX: 31.83 KG/M2

## 2021-09-22 DIAGNOSIS — L91.0 HYPERTROPHIC SCAR OF SKIN: Primary | ICD-10-CM

## 2021-09-22 PROCEDURE — 96372 THER/PROPH/DIAG INJ SC/IM: CPT | Performed by: OTOLARYNGOLOGY

## 2021-09-22 PROCEDURE — 11900 INJECT SKIN LESIONS </W 7: CPT | Performed by: OTOLARYNGOLOGY

## 2021-09-22 RX ORDER — TRIAMCINOLONE ACETONIDE 40 MG/ML
40 INJECTION, SUSPENSION INTRA-ARTICULAR; INTRAMUSCULAR ONCE
Status: COMPLETED | OUTPATIENT
Start: 2021-09-22 | End: 2021-09-22

## 2021-09-22 RX ADMIN — TRIAMCINOLONE ACETONIDE 40 MG: 40 INJECTION, SUSPENSION INTRA-ARTICULAR; INTRAMUSCULAR at 09:30

## 2021-09-22 NOTE — PROGRESS NOTES
Visit Vitals  /74 (BP 1 Location: Left upper arm, BP Patient Position: Sitting, BP Cuff Size: Adult)   Pulse 69   Temp 97.8 °F (36.6 °C) (Temporal)   Resp 18   Ht 5' 2\" (1.575 m)   Wt 173 lb (78.5 kg)   SpO2 98%   BMI 31.64 kg/m²     Chief Complaint   Patient presents with    Follow-up     wound check

## 2021-09-22 NOTE — PROGRESS NOTES
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 Exam -     Some extruded suture is removed  No erythema nor tenderness nor discharge  Mild fibrosis, induration  Moderate pincushioning    Skin prepped with alcohol. Kenalog injection done 0.5mL into flap. Tolerated well      Pathology report is showing no residual cancer with negative margin.    ==========================================================================   * * *FINAL PATHOLOGIC DIAGNOSIS* * *   Right lateral nasal wall, skin excision:        Nodular dermal scar.          No evidence of residual malignancy. *Electronically Signed Out By Sofia Stuart MD*         A/P - hypertrophic scar. Second-injection with kenalog today.   Fu 4 weeks

## 2021-10-05 DIAGNOSIS — M79.2 NEUROPATHIC PAIN: ICD-10-CM

## 2021-10-05 RX ORDER — GABAPENTIN 400 MG/1
CAPSULE ORAL
Qty: 90 CAPSULE | Refills: 1 | Status: SHIPPED | OUTPATIENT
Start: 2021-10-05 | End: 2021-12-13

## 2021-10-25 ENCOUNTER — OFFICE VISIT (OUTPATIENT)
Dept: ENT CLINIC | Age: 81
End: 2021-10-25
Payer: MEDICARE

## 2021-10-25 VITALS
RESPIRATION RATE: 17 BRPM | HEIGHT: 62 IN | WEIGHT: 173 LBS | OXYGEN SATURATION: 98 % | SYSTOLIC BLOOD PRESSURE: 124 MMHG | HEART RATE: 71 BPM | BODY MASS INDEX: 31.83 KG/M2 | TEMPERATURE: 98.4 F | DIASTOLIC BLOOD PRESSURE: 80 MMHG

## 2021-10-25 DIAGNOSIS — L91.0 HYPERTROPHIC SCAR OF SKIN: Primary | ICD-10-CM

## 2021-10-25 PROCEDURE — 96372 THER/PROPH/DIAG INJ SC/IM: CPT | Performed by: OTOLARYNGOLOGY

## 2021-10-25 PROCEDURE — 11900 INJECT SKIN LESIONS </W 7: CPT | Performed by: OTOLARYNGOLOGY

## 2021-10-25 RX ORDER — TRIAMCINOLONE ACETONIDE 40 MG/ML
40 INJECTION, SUSPENSION INTRA-ARTICULAR; INTRAMUSCULAR ONCE
Status: COMPLETED | OUTPATIENT
Start: 2021-10-25 | End: 2021-10-25

## 2021-10-25 RX ADMIN — TRIAMCINOLONE ACETONIDE 40 MG: 40 INJECTION, SUSPENSION INTRA-ARTICULAR; INTRAMUSCULAR at 11:40

## 2021-10-25 NOTE — PROGRESS NOTES
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 Exam -     Improved scar hypertrophy. 1 to 2 mm area of nodular fibrosis remains. Mild medial canthal webbing. Skin prepped with alcohol. Kenalog injection done 0.5mL into scar. Tolerated well      Pathology report is showing no residual cancer with negative margin.    ==========================================================================   * * *FINAL PATHOLOGIC DIAGNOSIS* * *   Right lateral nasal wall, skin excision:        Nodular dermal scar.          No evidence of residual malignancy. *Electronically Signed Out By Jamaal Orr MD*         A/P - hypertrophic scar. Third-injection with kenalog today.   Fu 4 weeks

## 2021-10-25 NOTE — PROGRESS NOTES
Visit Vitals  /80 (BP 1 Location: Left upper arm, BP Patient Position: Sitting, BP Cuff Size: Adult)   Pulse 71   Temp 98.4 °F (36.9 °C) (Temporal)   Resp 17   Ht 5' 2\" (1.575 m)   Wt 173 lb (78.5 kg)   SpO2 98%   BMI 31.64 kg/m²     Chief Complaint   Patient presents with    Follow-up     Hypertrophic scar of skin

## 2021-11-22 ENCOUNTER — OFFICE VISIT (OUTPATIENT)
Dept: ENT CLINIC | Age: 81
End: 2021-11-22
Payer: MEDICARE

## 2021-11-22 VITALS
DIASTOLIC BLOOD PRESSURE: 76 MMHG | BODY MASS INDEX: 31.83 KG/M2 | HEIGHT: 62 IN | HEART RATE: 71 BPM | SYSTOLIC BLOOD PRESSURE: 120 MMHG | TEMPERATURE: 98.3 F | RESPIRATION RATE: 18 BRPM | WEIGHT: 173 LBS | OXYGEN SATURATION: 98 %

## 2021-11-22 DIAGNOSIS — C44.321 SQUAMOUS CELL CANCER OF SKIN OF NOSE: Primary | ICD-10-CM

## 2021-11-22 PROCEDURE — G8536 NO DOC ELDER MAL SCRN: HCPCS | Performed by: OTOLARYNGOLOGY

## 2021-11-22 PROCEDURE — 1101F PT FALLS ASSESS-DOCD LE1/YR: CPT | Performed by: OTOLARYNGOLOGY

## 2021-11-22 PROCEDURE — 1090F PRES/ABSN URINE INCON ASSESS: CPT | Performed by: OTOLARYNGOLOGY

## 2021-11-22 PROCEDURE — G8427 DOCREV CUR MEDS BY ELIG CLIN: HCPCS | Performed by: OTOLARYNGOLOGY

## 2021-11-22 PROCEDURE — G8417 CALC BMI ABV UP PARAM F/U: HCPCS | Performed by: OTOLARYNGOLOGY

## 2021-11-22 PROCEDURE — 99212 OFFICE O/P EST SF 10 MIN: CPT | Performed by: OTOLARYNGOLOGY

## 2021-11-22 PROCEDURE — G8400 PT W/DXA NO RESULTS DOC: HCPCS | Performed by: OTOLARYNGOLOGY

## 2021-11-22 PROCEDURE — G8432 DEP SCR NOT DOC, RNG: HCPCS | Performed by: OTOLARYNGOLOGY

## 2021-11-22 NOTE — PROGRESS NOTES
1 month follow-up today. Patient states the injections have been working the bump on the nose seems to be much smaller. Exam -     Improved scar hypertrophy. Nearly complete resolution of her hypertrophic scar. Mild medial canthal webbing. No evidence of cancer recurrence. Pathology report is showing no residual cancer with negative margin.    ==========================================================================   * * *FINAL PATHOLOGIC DIAGNOSIS* * *   Right lateral nasal wall, skin excision:        Nodular dermal scar.          No evidence of residual malignancy. *Electronically Signed Out By Son Handley MD*         A/P - hypertrophic scar. She is status post 3 postop injections of Kenalog. There is almost no residual scar hypertrophy now. I do not recommend further Kenalog injections. She will continue follow-up with dermatology and can come back to me as needed.

## 2021-11-22 NOTE — PROGRESS NOTES
Visit Vitals  /76 (BP 1 Location: Left upper arm, BP Patient Position: Sitting, BP Cuff Size: Adult)   Pulse 71   Temp 98.3 °F (36.8 °C) (Temporal)   Resp 18   Ht 5' 2\" (1.575 m)   Wt 173 lb (78.5 kg)   SpO2 98%   BMI 31.64 kg/m²     Chief Complaint   Patient presents with    Follow-up     Hypertrophic scar of skin

## 2021-12-01 RX ORDER — DONEPEZIL HYDROCHLORIDE 10 MG/1
10 TABLET, FILM COATED ORAL
Qty: 90 TABLET | Refills: 0 | Status: SHIPPED | OUTPATIENT
Start: 2021-12-01 | End: 2022-02-02 | Stop reason: SDUPTHER

## 2021-12-13 DIAGNOSIS — M79.2 NEUROPATHIC PAIN: ICD-10-CM

## 2021-12-13 RX ORDER — GABAPENTIN 400 MG/1
CAPSULE ORAL
Qty: 90 CAPSULE | Refills: 1 | Status: SHIPPED | OUTPATIENT
Start: 2021-12-13 | End: 2022-05-02

## 2022-02-02 ENCOUNTER — OFFICE VISIT (OUTPATIENT)
Dept: NEUROLOGY | Age: 82
End: 2022-02-02
Payer: MEDICARE

## 2022-02-02 VITALS
BODY MASS INDEX: 29.44 KG/M2 | RESPIRATION RATE: 16 BRPM | OXYGEN SATURATION: 93 % | SYSTOLIC BLOOD PRESSURE: 150 MMHG | DIASTOLIC BLOOD PRESSURE: 86 MMHG | HEART RATE: 78 BPM | HEIGHT: 62 IN | WEIGHT: 160 LBS

## 2022-02-02 DIAGNOSIS — F03.90 DEMENTIA WITHOUT BEHAVIORAL DISTURBANCE, UNSPECIFIED DEMENTIA TYPE: Primary | ICD-10-CM

## 2022-02-02 PROCEDURE — G8427 DOCREV CUR MEDS BY ELIG CLIN: HCPCS | Performed by: SPECIALIST

## 2022-02-02 PROCEDURE — G8536 NO DOC ELDER MAL SCRN: HCPCS | Performed by: SPECIALIST

## 2022-02-02 PROCEDURE — G8400 PT W/DXA NO RESULTS DOC: HCPCS | Performed by: SPECIALIST

## 2022-02-02 PROCEDURE — G8432 DEP SCR NOT DOC, RNG: HCPCS | Performed by: SPECIALIST

## 2022-02-02 PROCEDURE — 99214 OFFICE O/P EST MOD 30 MIN: CPT | Performed by: SPECIALIST

## 2022-02-02 PROCEDURE — 1090F PRES/ABSN URINE INCON ASSESS: CPT | Performed by: SPECIALIST

## 2022-02-02 PROCEDURE — G8417 CALC BMI ABV UP PARAM F/U: HCPCS | Performed by: SPECIALIST

## 2022-02-02 PROCEDURE — 1101F PT FALLS ASSESS-DOCD LE1/YR: CPT | Performed by: SPECIALIST

## 2022-02-02 RX ORDER — DONEPEZIL HYDROCHLORIDE 10 MG/1
10 TABLET, FILM COATED ORAL
Qty: 90 TABLET | Refills: 1 | Status: SHIPPED | OUTPATIENT
Start: 2022-02-02 | End: 2022-09-27

## 2022-02-02 NOTE — PATIENT INSTRUCTIONS
Patient history reviewed patient examined. Patient staying busy body and that is good and she has a son that looks in on her and has enjoyed some good times with family down in [facility] and medication will be renewed by request today. Again stays reasonably safely busy as possible and will catch up with each other in about 6 months.

## 2022-02-02 NOTE — PROGRESS NOTES
Chief Complaint   Patient presents with    Memory Loss     memory is doing fine per patient/ states she remembers the important things      Visit Vitals  BP (!) 150/86 (BP 1 Location: Right arm, BP Patient Position: Sitting)   Pulse 78   Resp 16   Ht 5' 2\" (1.575 m)   Wt 72.6 kg (160 lb)   SpO2 93%   BMI 29.26 kg/m²

## 2022-02-02 NOTE — LETTER
2/2/2022    Patient: Simon Mota   YOB: 1940   Date of Visit: 2/2/2022     Joan Jacques NP  [address] [place] [id number]  Via Fax: [phone]    Dear Joan Jacques NP,      Thank you for referring Ms. Simon Mota to [facility] for evaluation. My notes for this consultation are attached. If you have questions, please do not hesitate to call me. I look forward to following your patient along with you.       Sincerely,    Geovani Luis MD

## 2022-02-02 NOTE — PROGRESS NOTES
Neurology Consult      Subjective:      Felicia Hernández is a 80 y.o. female who comes in today for dementia without behavioral disturbance follow-up. Is on Aricept tolerates it well and was renewed by request.  No downside to taking the product. As referenced on the previous visit she made her way down to [facility] and was driven by family and had a great time with a wedding and catching up with her folks in general.  Is very proud of her [employer] at home including cooking and cleaning and self-care and sun drops and to make sure everything is okay. She realizes the more she does for herself the more dividends there are in terms of physical and cognitive strengthening. Is still getting some minor discomfort from the left forearm fracture she suffered last year but does not hold her back. Eating and sleeping is good, mood and behavior is fine and I am not aware of any psychosis in the picture etc.  Eventually could talk about the addition of the drug Namenda as a complementary cognitive supportive treatment. Had no complaints and will see her back in 6 months. Current Outpatient Medications   Medication Sig Dispense Refill    donepeziL (ARICEPT) 10 mg tablet Take 1 Tablet by mouth nightly. To begin after the 5 mg dose is completed. 90 Tablet 1    ketorolac (ACULAR) 0.5 % ophthalmic solution ketorolac 0.5 % eye drops      ofloxacin (FLOXIN) 0.3 % ophthalmic solution ofloxacin 0.3 % eye drops      DULoxetine (CYMBALTA) 60 mg capsule Take 60 mg by mouth daily.  vit C/vit E/lutein/min/omega-3 (OCUVITE PO) Take  by mouth.  raNITIdine (ZANTAC) 150 mg tablet Take 150 mg by mouth two (2) times a day.  loratadine (CLARITIN) 10 mg tablet Take 10 mg by mouth.  calcium carbonate (CALTREX) 600 mg calcium (1,500 mg) tablet Take 600 mg by mouth two (2) times a day.  omega-3 fatty acids (FISH OIL CONCENTRATE PO) Take  by mouth.  aspirin 81 mg chewable tablet Take 81 mg by mouth daily.  gabapentin (NEURONTIN) 400 mg capsule TAKE 1 CAPSULE BY MOUTH THREE TIMES A DAY FOR NEUROPATHIC PAIN 90 Capsule 1      No Known Allergies  Past Medical History:   Diagnosis Date    Arthritis     Incontinence in female     Snoring       Past Surgical History:   Procedure Laterality Date    HX KNEE REPLACEMENT        Social History     Socioeconomic History    Marital status: UNKNOWN     Spouse name: Not on file    Number of children: Not on file    Years of education: Not on file    Highest education level: Not on file   Occupational History    Not on file   Tobacco Use    Smoking status: Never Smoker    Smokeless tobacco: Never Used   Substance and Sexual Activity    Alcohol use: No    Drug use: No    Sexual activity: Not on file   Other Topics Concern    Not on file   Social History Narrative    Not on file     Social Determinants of Health     Financial Resource Strain:     Difficulty of Paying Living Expenses: Not on file   Food Insecurity:     Worried About Running Out of Food in the Last Year: Not on file    Kevin of Food in the Last Year: Not on file   Transportation Needs:     Lack of Transportation (Medical): Not on file    Lack of Transportation (Non-Medical):  Not on file   Physical Activity:     Days of Exercise per Week: Not on file    Minutes of Exercise per Session: Not on file   Stress:     Feeling of Stress : Not on file   Social Connections:     Frequency of Communication with Friends and Family: Not on file    Frequency of Social Gatherings with Friends and Family: Not on file    Attends Confucianist Services: Not on file    Active Member of Clubs or Organizations: Not on file    Attends Club or Organization Meetings: Not on file    Marital Status: Not on file   Intimate Partner Violence:     Fear of Current or Ex-Partner: Not on file    Emotionally Abused: Not on file    Physically Abused: Not on file    Sexually Abused: Not on file   Housing Stability:     Unable to Pay for Housing in the Last Year: Not on file    Number of Places Lived in the Last Year: Not on file    Unstable Housing in the Last Year: Not on file      No family history on file. Visit Vitals  BP (!) 150/86 (BP 1 Location: Right arm, BP Patient Position: Sitting)   Pulse 78   Resp 16   Ht 5' 2\" (1.575 m)   Wt 72.6 kg (160 lb)   SpO2 93%   BMI 29.26 kg/m²        Review of Systems:   A comprehensive review of systems was negative except for that written in the HPI. Neuro Exam:     Appearance: The patient is well developed, well nourished, provides a coherent history and is in no acute distress. Mental Status: Oriented to time, place and person. Mood and affect appropriate. Cranial Nerves:   Intact visual fields. Fundi are benign. EVARISTO, EOM's full, no nystagmus, no ptosis. Facial sensation is normal. Corneal reflexes are intact. Facial movement is symmetric. Hearing is normal bilaterally. Palate is midline with normal sternocleidomastoid and trapezius muscles are normal. Tongue is midline. Motor:  5/5 strength in upper and lower proximal and distal muscles. Normal bulk and tone. No fasciculations. Reflexes:   Deep tendon reflexes 2+/4 and symmetrical.   Sensory:   Normal to touch, pinprick and vibration. Gait:  Normal gait. Tremor:   No tremor noted. Cerebellar:  No cerebellar signs present. Neurovascular:  Normal heart sounds and regular rhythm, peripheral pulses intact, and no carotid bruits. Assessment:   Dementia without behavioral disturbance. Renewed Aricept by request and she mazes me with her can do spirit and lives alone and son drops by to make sure everything is running smoothly. Sounds invigorated from her trip to [facility] and catching up with family and such. Suggest revisit in 6 months. Plan:   Revisit 6 months.   Signed by :  Donny Horvath MD

## 2022-03-18 PROBLEM — H90.3 SENSORINEURAL HEARING LOSS (SNHL) OF BOTH EARS: Status: ACTIVE | Noted: 2021-06-28

## 2022-03-19 PROBLEM — C44.321 SQUAMOUS CELL CANCER OF SKIN OF NOSE: Status: ACTIVE | Noted: 2021-06-28

## 2022-06-21 ENCOUNTER — OFFICE VISIT (OUTPATIENT)
Dept: INFECTIOUS DISEASES | Age: 82
End: 2022-06-21
Payer: MEDICARE

## 2022-06-21 VITALS
SYSTOLIC BLOOD PRESSURE: 134 MMHG | TEMPERATURE: 97.4 F | WEIGHT: 160 LBS | OXYGEN SATURATION: 96 % | DIASTOLIC BLOOD PRESSURE: 85 MMHG | RESPIRATION RATE: 16 BRPM | BODY MASS INDEX: 29.26 KG/M2 | HEART RATE: 70 BPM

## 2022-06-21 DIAGNOSIS — S62.102A WRIST FRACTURE, CLOSED, LEFT, INITIAL ENCOUNTER: Primary | ICD-10-CM

## 2022-06-21 DIAGNOSIS — T84.613A: ICD-10-CM

## 2022-06-21 DIAGNOSIS — A49.02 MRSA INFECTION: ICD-10-CM

## 2022-06-21 PROCEDURE — G8510 SCR DEP NEG, NO PLAN REQD: HCPCS | Performed by: INTERNAL MEDICINE

## 2022-06-21 PROCEDURE — 1101F PT FALLS ASSESS-DOCD LE1/YR: CPT | Performed by: INTERNAL MEDICINE

## 2022-06-21 PROCEDURE — G8427 DOCREV CUR MEDS BY ELIG CLIN: HCPCS | Performed by: INTERNAL MEDICINE

## 2022-06-21 PROCEDURE — G8417 CALC BMI ABV UP PARAM F/U: HCPCS | Performed by: INTERNAL MEDICINE

## 2022-06-21 PROCEDURE — 1090F PRES/ABSN URINE INCON ASSESS: CPT | Performed by: INTERNAL MEDICINE

## 2022-06-21 PROCEDURE — G8536 NO DOC ELDER MAL SCRN: HCPCS | Performed by: INTERNAL MEDICINE

## 2022-06-21 PROCEDURE — 99203 OFFICE O/P NEW LOW 30 MIN: CPT | Performed by: INTERNAL MEDICINE

## 2022-06-21 PROCEDURE — G8400 PT W/DXA NO RESULTS DOC: HCPCS | Performed by: INTERNAL MEDICINE

## 2022-06-22 ENCOUNTER — TELEPHONE (OUTPATIENT)
Dept: INFECTIOUS DISEASES | Age: 82
End: 2022-06-22

## 2022-06-22 NOTE — TELEPHONE ENCOUNTER
Phone call to the patient re: cost for antibiotic IV treatment. [employer] checked with her insurance and her out of pocket will be $91 a day. Dr Shraddha Carter wants me to check with the patient to see if she wants to proceed with this treatment. The patient states yes she will pay  the amount per day that is not covered by her insurance. Dr Shraddha Carter will call Alex Brooks with that information and has set up an appointment for Picc line placement for tomorrow at 10:00 am [id number]. The patient understands and is agreeable with the appointment.

## 2022-06-23 ENCOUNTER — HOSPITAL ENCOUNTER (OUTPATIENT)
Dept: INFUSION THERAPY | Age: 82
Discharge: HOME | End: 2022-06-23
Payer: MEDICARE

## 2022-06-23 LAB
ALBUMIN SERPL-MCNC: 3.1 G/DL (ref 3.5–5)
ANION GAP SERPL CALC-SCNC: 5 MMOL/L (ref 5–15)
BUN SERPL-MCNC: 16 MG/DL (ref 6–20)
BUN/CREAT SERPL: 25 (ref 12–20)
CA-I BLD-MCNC: 8.8 MG/DL (ref 8.5–10.1)
CHLORIDE SERPL-SCNC: 107 MMOL/L (ref 97–108)
CO2 SERPL-SCNC: 29 MMOL/L (ref 21–32)
CREAT SERPL-MCNC: 0.63 MG/DL (ref 0.55–1.02)
CRP SERPL HS-MCNC: 1.1 MG/L
ERYTHROCYTE [SEDIMENTATION RATE] IN BLOOD: 6 MM/HR (ref 0–30)
GLUCOSE SERPL-MCNC: 98 MG/DL (ref 65–100)
PHOSPHATE SERPL-MCNC: 3.3 MG/DL (ref 2.6–4.7)
POTASSIUM SERPL-SCNC: 4.3 MMOL/L (ref 3.5–5.1)
SODIUM SERPL-SCNC: 141 MMOL/L (ref 136–145)

## 2022-06-23 PROCEDURE — 36569 INSJ PICC 5 YR+ W/O IMAGING: CPT

## 2022-06-23 PROCEDURE — 74011250636 HC RX REV CODE- 250/636: Performed by: INTERNAL MEDICINE

## 2022-06-23 PROCEDURE — 36415 COLL VENOUS BLD VENIPUNCTURE: CPT

## 2022-06-23 PROCEDURE — 85652 RBC SED RATE AUTOMATED: CPT

## 2022-06-23 PROCEDURE — 86141 C-REACTIVE PROTEIN HS: CPT

## 2022-06-23 PROCEDURE — 96365 THER/PROPH/DIAG IV INF INIT: CPT

## 2022-06-23 PROCEDURE — 80069 RENAL FUNCTION PANEL: CPT

## 2022-06-23 RX ADMIN — VANCOMYCIN HYDROCHLORIDE 1000 MG: 1 INJECTION, POWDER, LYOPHILIZED, FOR SOLUTION INTRAVENOUS at 11:49

## 2022-06-23 NOTE — PROGRESS NOTES
PICC Placement Note    PRE-PROCEDURE VERIFICATION  Correct Procedure: yes  Correct Site:  yes  Temperature:  , Temperature Source:    No results for input(s): BUN, CREA, PLT, INR, WBC, PLTEXT, INREXT in the last 72 hours. No lab exists for component: APTHR  Allergies: Patient has no known allergies. Education materials for PICC Care given to family: yes. See Patient Education activity for further details. PICC Booklet placed on chart: yes    PROCEDURE DETAIL  A single lumen PICC line was started for antibiotic therapy. The following documentation is in addition to the PICC properties in the lines/airways flowsheet :  Lot #: [id number]  xylocaine used: yes  Mid-Arm Circumference: 29 (cm)  Internal Catheter Length: 39 (cm)  Internal Catheter Total Length: 40 (cm)  Vein Selection for PICC:right basilic  Central Line Bundle followed yes  Complication Related to Insertion: none    The placement was verified by 3CG yes. The 3CG results state the tip location is on the right side and the tip overlies the lower superior vena cava.        Line is okay to use: yes    Waldemar Loo RN

## 2022-06-23 NOTE — ROUTINE PROCESS
Pt tolerated infusion well, DC instructions reviewed with patient and son-- verbalized understanding. Pt ambulatory off unit with son -- steady gait noted.

## 2022-06-23 NOTE — DISCHARGE INSTRUCTIONS
Patient Education   Patient Education   Vancomycin (By injection)   Vancomycin (vkm-nwo-WII-sin)  Treats infections. Belongs to a class of drugs called antibiotics. Brand Name(s): PremierPro Rx Vancomycin HCl, Vancomycin HCl Novaplus   There may be other brand names for this medicine. When This Medicine Should Not Be Used: This medicine is not right for everyone. You should not receive it if you had an allergic reaction to vancomycin, corn, or corn products. How to Use This Medicine:   Injectable  · Your doctor will prescribe your dose and schedule. This medicine is given through a needle placed in a vein. · A nurse or other health provider will give you this medicine. Drugs and Foods to Avoid:   Ask your doctor or pharmacist before using any other medicine, including over-the-counter medicines, vitamins, and herbal products. · Some medicines can affect how this medicine works. Tell your doctor if you are using any of the following:  ¨ Amphotericin B, bacitracin, polymyxin B, cisplatin, colistin, viomycin  ¨ Aminoglycoside antibiotic (including amikacin, gentamicin, streptomycin)  ¨ Diuretic (water pill)  ¨ NSAID pain or arthritis medicine (including aspirin, celecoxib, diclofenac, ibuprofen, naproxen)  Warnings While Using This Medicine:   · Tell your doctor if you are pregnant or breastfeeding, or if you have kidney disease, congestive heart failure, or hearing problems. Tell your doctor if you have a restricted sodium intake. · This medicine may cause the following problems:  ¨ Infusion reactions  ¨ Kidney damage  ¨ Hearing loss  · This medicine can cause diarrhea. Call your doctor if the diarrhea becomes severe, does not stop, or is bloody. Do not take any medicine to stop diarrhea until you have talked to your doctor. Diarrhea can occur 2 months or more after you stop taking this medicine. · This medicine may make you bleed, bruise, or get infections more easily.  Take precautions to prevent illness and injury. Wash your hands often. · Your doctor will do lab tests at regular visits to check on the effects of this medicine. Keep all appointments. Possible Side Effects While Using This Medicine:   Call your doctor right away if you notice any of these side effects:  · Allergic reaction: Itching or hives, swelling in your face or hands, swelling or tingling in your mouth or throat, chest tightness, trouble breathing  · Blistering, peeling, red skin rash  · Chest pain, trouble breathing  · Decrease in how much or how often you urinate, rapid weight gain, swelling of your hands, ankles, or feet  · Diarrhea that may contain blood  · Fever, chills, cough, sore throat, and body aches  · Lightheadedness, dizziness, or fainting  · Ringing in the ears or trouble hearing  · Swelling, pain, or redness under your skin where the needle is placed  · Unusual bleeding, bruising, or weakness  If you notice other side effects that you think are caused by this medicine, tell your doctor. Call your doctor for medical advice about side effects. You may report side effects to FDA at 1-307-FDA-8629  © 2017 [facility] Information is for End User's use only and may not be sold, redistributed or otherwise used for commercial purposes. The above information is an  only. It is not intended as medical advice for individual conditions or treatments. Talk to your doctor, nurse or pharmacist before following any medical regimen to see if it is safe and effective for you. Peripherally Inserted Central Catheter (PICC): Care Instructions  Your Care Instructions     A peripherally inserted central catheter (PICC) is a soft, flexible tube that runs under your skin from a vein in your arm to a large vein near your heart. One end of the catheter stays outside your body. It is a type of central venous catheter, or central venous line. You may have it for weeks or months.   A PICC is used to give you medicine, blood products, nutrients, or fluids. A PICC makes doing these things more comfortable for you because they are put directly into the catheter. So you will not be stuck with a needle every time. A PICC may be used to draw blood for tests only if another vein, such as in the hand or arm, can't be used. The end of the PICC sometimes has two or three openings so that you can get more than one type of fluid or medicine at a time. Your doctor may give you medicine to make you feel relaxed. You may feel a little pain when your doctor numbs your arm. Your doctor will then thread the catheter up a vein in your arm to a larger vein. You will not feel any pain. The doctor may use stitches or other devices to hold the catheter in place where it exits your arm. After the procedure, the site may be sore for a day or two. Follow-up care is a key part of your treatment and safety. Be sure to make and go to all appointments, and call your doctor if you are having problems. It's also a good idea to know your test results and keep a list of the medicines you take. How can you care for yourself at home? · Do not wear jewelry, such as necklaces, that can catch on the catheter. · If the catheter breaks, follow the instructions your doctor gave you. If you have no instructions, clamp or tie off the catheter. Then see a doctor as soon as possible. · To help prevent infection, take a shower instead of a bath. Do not go swimming with the catheter. · Try to keep the area dry. When you shower, cover the area with waterproof material, such as plastic wrap. · Never touch the open end of the catheter if the cap is off. · Never use scissors, knives, pins, or other sharp objects near the catheter or other tubing. · If your catheter has a clamp, keep it clamped when you are not using it. · Fasten or tape the catheter to your body to prevent pulling or dangling. · Avoid clothing that rubs or pulls on your catheter.   · Avoid bending or crimping your catheter. · Always wash your hands before you touch your catheter. · Wear loose clothing over the catheter for the first 10 to 14 days. When getting dressed, be careful not to pull on the catheter. How to change the dressing  Since the PICC is in one of your arms, you won't be able to change the dressing on your own. You'll need someone to help you change the dressing using the same instructions that your doctor or nurse gave you. Your PICC dressing should be changed at least once a week. If the dressing gets loose, wet, or dirty, it must be changed more often to prevent infection. Your doctor may also give you instructions for when to change the dressing. Be sure you have all your supplies ready. These include medical tape, a surgical mask, sterile gloves, and your dressing kit. The names and brands of the items will vary. Your doctor or nurse may give you specific instructions for changing the dressing. Here are basic tips for how to change the dressing. 1. Wash your hands with soap and water. Do this for 15 seconds. Dry them with paper towels. 2. Put on the surgical mask. 3. Loosen and remove your old dressing. Peel it toward the PICC, not away from it. You may need to use an adhesive remover if it doesn't come off easily. 4. Look at the site carefully for redness, swelling, drainage, tenderness, or warmth. If you notice any of these, call your doctor. 5. Wash your hands again. 6. Open your dressing kit, and put on the sterile gloves. 7. Clean the site with the supplies in the dressing kit. 8. Use the dressing that your doctor gave you, and place it over the site. 9. Tape the PICC tubing to your skin. Make sure it doesn't dangle or pull. When should you call for help? Call 911 anytime you think you may need emergency care.  For example, call if:    · You passed out (lost consciousness).     · You have severe trouble breathing.     · You have sudden chest pain and shortness of breath, or you cough up blood.     · You have a fast or uneven pulse. Call your doctor now or seek immediate medical care if:    · You have signs of infection, such as:  ? Increased pain, swelling, warmth, or redness. ? Red streaks leading from the area. ? Pus or blood draining from the area. ? A fever.     · You have swelling in your face, chest, neck, or arm on the side where the catheter is.     · You have signs of a blood clot, such as bulging veins near the catheter.     · Your catheter is leaking, cracked, or clogged.     · You feel resistance when you inject medicine or fluids into your catheter.     · Your catheter is out of place. This may happen after severe coughing or vomiting, or if you pull on the catheter.     · You have chest pain or shortness of breath. Watch closely for changes in your health, and be sure to contact your doctor if:    · You have any concerns about your catheter. Where can you learn more? Go to http://www.gray.com/  Enter L935 in the search box to learn more about \"Peripherally Inserted Central Catheter (PICC): Care Instructions. \"  Current as of: July 1, 2021               Content Version: 13.2  © 2238-4679 [employer]. Care instructions adapted under license by [employer] (which disclaims liability or warranty for this information). If you have questions about a medical condition or this instruction, always ask your healthcare professional. [place] [address] any warranty or liability for your use of this information.

## 2022-06-23 NOTE — ROUTINE PROCESS
PICC line placement complete-- pt tolerated well. RN in room to draw labs and begin Vanc infusion. Son also in room. HH Rep in room educating patient and son on infusion, lunch tray ordered.

## 2022-07-12 ENCOUNTER — OFFICE VISIT (OUTPATIENT)
Dept: INFECTIOUS DISEASES | Age: 82
End: 2022-07-12
Payer: MEDICARE

## 2022-07-12 VITALS
WEIGHT: 161 LBS | HEART RATE: 74 BPM | RESPIRATION RATE: 15 BRPM | OXYGEN SATURATION: 96 % | SYSTOLIC BLOOD PRESSURE: 125 MMHG | BODY MASS INDEX: 29.45 KG/M2 | TEMPERATURE: 97.6 F | DIASTOLIC BLOOD PRESSURE: 82 MMHG

## 2022-07-12 DIAGNOSIS — M86.632: Primary | ICD-10-CM

## 2022-07-12 DIAGNOSIS — G89.29 WRIST PAIN, CHRONIC, LEFT: ICD-10-CM

## 2022-07-12 DIAGNOSIS — A49.02 MRSA INFECTION: ICD-10-CM

## 2022-07-12 DIAGNOSIS — M25.532 WRIST PAIN, CHRONIC, LEFT: ICD-10-CM

## 2022-07-12 PROCEDURE — 1090F PRES/ABSN URINE INCON ASSESS: CPT | Performed by: INTERNAL MEDICINE

## 2022-07-12 PROCEDURE — 99214 OFFICE O/P EST MOD 30 MIN: CPT | Performed by: INTERNAL MEDICINE

## 2022-07-12 PROCEDURE — G8432 DEP SCR NOT DOC, RNG: HCPCS | Performed by: INTERNAL MEDICINE

## 2022-07-12 PROCEDURE — 1101F PT FALLS ASSESS-DOCD LE1/YR: CPT | Performed by: INTERNAL MEDICINE

## 2022-07-12 PROCEDURE — G8536 NO DOC ELDER MAL SCRN: HCPCS | Performed by: INTERNAL MEDICINE

## 2022-07-12 PROCEDURE — G8427 DOCREV CUR MEDS BY ELIG CLIN: HCPCS | Performed by: INTERNAL MEDICINE

## 2022-07-12 PROCEDURE — G8417 CALC BMI ABV UP PARAM F/U: HCPCS | Performed by: INTERNAL MEDICINE

## 2022-07-12 PROCEDURE — G8400 PT W/DXA NO RESULTS DOC: HCPCS | Performed by: INTERNAL MEDICINE

## 2022-07-12 RX ORDER — VANCOMYCIN HYDROCHLORIDE 10 G/100ML
INJECTION, POWDER, LYOPHILIZED, FOR SOLUTION INTRAVENOUS
COMMUNITY
Start: 2022-07-07

## 2022-07-12 NOTE — PROGRESS NOTES
Subjective  Lazara Resides    HPI: Very pleasant WF presenting for a routine f/u of chronic left radius osteomyelitis following ORIF after a fracture a year ago. She is s/p debridement by Dr Shalom Stanton in 06/2022 w isolation of MRSA from wound Cx, for which she is on a 6 wk course of Vancomycin from 06/23/22. She admits to tolerating Vancomycin w/o side effects, denies missed doses or side effects. Baseline labs prior to initiation of OPAT shows an ESR of 6 and CRP of 1.1 (06/23), I have not received recent lab results from [facility]. She notes decreased left wrist pain and swelling since being on antimicrobial therapy. She is otherwise doing well and denies acute complaints on assessment today. ROS  Review of Systems   Constitutional: Negative. Eyes: Negative. Respiratory: Negative. Cardiovascular: Negative. Gastrointestinal: Negative. Genitourinary: Negative. Musculoskeletal:        Decreased left wrist pain and swelling    Neurological: Negative. Past Medical History:   Diagnosis Date    Arthritis     Incontinence in female     Snoring         Past Surgical History:   Procedure Laterality Date    HX KNEE REPLACEMENT          Social History     Tobacco Use    Smoking status: Never Smoker    Smokeless tobacco: Never Used   Vaping Use    Vaping Use: Never used   Substance Use Topics    Alcohol use: No    Drug use: No        Family History   Problem Relation Age of Onset    Diabetes Mother     Heart Disease Father         No Known Allergies     Objective  Physical Exam  Constitutional:       Appearance: Normal appearance. Cardiovascular:      Rate and Rhythm: Normal rate and regular rhythm. Pulses: Normal pulses. Heart sounds: Normal heart sounds. Pulmonary:      Effort: Pulmonary effort is normal.      Breath sounds: Normal breath sounds. Abdominal:      General: Abdomen is flat. Palpations: Abdomen is soft.    Musculoskeletal:      Comments: Left wrist swelling, no tenderness, healed incisional wound, no erythema or warmth    Skin:     General: Skin is warm and dry. Neurological:      Mental Status: She is alert. Assessment & Plan  Diagnoses and all orders for this visit:    1. Chronic osteomyelitis of left radius (HCC)      -H/o Fracture 1 year ago, s/p ORIF complicated by infection, MRSA isolated from Cx       -Pt on a  6 wk course of Vanc from 06/23      -Normal baseline ESR and CRP, repeat is pending       -D/c PICC line after completion of recommended course of therapy     2. MRSA infection: On long term Vanc as above     3.  Wrist pain, chronic, left: No evidence of acute infection on exam, suspect arthritic pain       Will defer pain mgt to orthopedics

## 2022-08-03 ENCOUNTER — TELEPHONE (OUTPATIENT)
Dept: INFECTIOUS DISEASES | Age: 82
End: 2022-08-03

## 2022-08-03 NOTE — TELEPHONE ENCOUNTER
Vivian Stewart the patient's home health nurse called in to inform you that they will be pulling the patient's PICC line and discontinuing their care with the patient after they pull the line. She also wanted to know if you needed them to continue to draw labs after they pull the line or stop the labs.  Vivian Stewart can be reached at [phone]

## 2022-08-19 ENCOUNTER — OFFICE VISIT (OUTPATIENT)
Dept: NEUROLOGY | Age: 82
End: 2022-08-19
Payer: MEDICARE

## 2022-08-19 VITALS
BODY MASS INDEX: 29.45 KG/M2 | SYSTOLIC BLOOD PRESSURE: 110 MMHG | WEIGHT: 161 LBS | OXYGEN SATURATION: 96 % | DIASTOLIC BLOOD PRESSURE: 70 MMHG | HEART RATE: 59 BPM

## 2022-08-19 DIAGNOSIS — F03.90 DEMENTIA WITHOUT BEHAVIORAL DISTURBANCE, UNSPECIFIED DEMENTIA TYPE: Primary | ICD-10-CM

## 2022-08-19 PROCEDURE — G8432 DEP SCR NOT DOC, RNG: HCPCS | Performed by: SPECIALIST

## 2022-08-19 PROCEDURE — G8417 CALC BMI ABV UP PARAM F/U: HCPCS | Performed by: SPECIALIST

## 2022-08-19 PROCEDURE — 1090F PRES/ABSN URINE INCON ASSESS: CPT | Performed by: SPECIALIST

## 2022-08-19 PROCEDURE — G8400 PT W/DXA NO RESULTS DOC: HCPCS | Performed by: SPECIALIST

## 2022-08-19 PROCEDURE — G8427 DOCREV CUR MEDS BY ELIG CLIN: HCPCS | Performed by: SPECIALIST

## 2022-08-19 PROCEDURE — 99214 OFFICE O/P EST MOD 30 MIN: CPT | Performed by: SPECIALIST

## 2022-08-19 PROCEDURE — G8536 NO DOC ELDER MAL SCRN: HCPCS | Performed by: SPECIALIST

## 2022-08-19 PROCEDURE — 1123F ACP DISCUSS/DSCN MKR DOCD: CPT | Performed by: SPECIALIST

## 2022-08-19 PROCEDURE — 1101F PT FALLS ASSESS-DOCD LE1/YR: CPT | Performed by: SPECIALIST

## 2022-08-19 NOTE — PATIENT INSTRUCTIONS
Patient history reviewed patient examined. Seems to maintain her independence and has steady-state support from son and neighbors etc.  We will continue with the Aricept as dosed and on next visit lets make a definite look toward adding the drug Namenda as a helper drug. Stay safely busy and will go for the [address] [place] [address] location which sounds like it is more user-friendly. If the weather is bad please reschedule. Revisit 6 months.

## 2022-08-19 NOTE — PROGRESS NOTES
No changes  She did have an infection in the arm post-op, has been resolved, didn't notice any changes then  Still living alone with no issues

## 2022-08-19 NOTE — PROGRESS NOTES
Neurology Consult      Subjective:      Rosetta Dancer is a 80 y.o. female who comes in today with her son. Has done well and the only bump in the road medically or surgically was an infection of the existing hardware in her left forearm from a previous fracture that had to be treated intravenously. Otherwise lives alone and is very self-sufficient with her son keeping a very close watch on her. In the context of her infection could count on help from the son of course with transportation and other intervention as well as neighbors and [employer] members. Continues to be self-sufficient in the house with housecleaning and no psychosis inferred as it goes to hallucinations delusions etc.  Is maintaining her weight and appetite and special sensory function intact. Sleeping is good. We took additional time on this occasion to talk even more completely and in detail about the new drug addition Namenda. We will set our sites on the next revisit in 6 months to accomplish that and as I shared with patient and son it usually is more user-friendly in terms of side effects etc. compared to the Aricept. I thought her exam was baseline and once again impressed with her can-do spirit. She offered no complaints. It sounds like the [address] [place] [address] location will be a significant improvement on her travel time so we will shoot for that catch up point. If the weather is bad please reschedule. Current Outpatient Medications   Medication Sig Dispense Refill    vancomycin (VANCOCIN) 10 gram solr       gabapentin (NEURONTIN) 400 mg capsule TAKE 1 CAPSULE BY MOUTH THREE TIMES A DAY FOR NEUROPATHIC PAIN 90 Capsule 2    donepeziL (ARICEPT) 10 mg tablet Take 1 Tablet by mouth nightly. To begin after the 5 mg dose is completed.  90 Tablet 1    ketorolac (ACULAR) 0.5 % ophthalmic solution ketorolac 0.5 % eye drops      ofloxacin (FLOXIN) 0.3 % ophthalmic solution ofloxacin 0.3 % eye drops      DULoxetine (CYMBALTA) 60 mg capsule Take 60 mg by mouth daily. vit C/vit E/lutein/min/omega-3 (OCUVITE PO) Take  by mouth. raNITIdine (ZANTAC) 150 mg tablet Take 150 mg by mouth two (2) times a day. loratadine (CLARITIN) 10 mg tablet Take 10 mg by mouth.      calcium carbonate (CALTREX) 600 mg calcium (1,500 mg) tablet Take 600 mg by mouth two (2) times a day. omega-3 fatty acids (FISH OIL CONCENTRATE PO) Take  by mouth. aspirin 81 mg chewable tablet Take 81 mg by mouth daily. No Known Allergies  Past Medical History:   Diagnosis Date    Arthritis     Incontinence in female     Snoring       Past Surgical History:   Procedure Laterality Date    HX KNEE REPLACEMENT        Social History     Socioeconomic History    Marital status: UNKNOWN     Spouse name: Not on file    Number of children: Not on file    Years of education: Not on file    Highest education level: Not on file   Occupational History    Not on file   Tobacco Use    Smoking status: Never    Smokeless tobacco: Never   Vaping Use    Vaping Use: Never used   Substance and Sexual Activity    Alcohol use: No    Drug use: No    Sexual activity: Not on file   Other Topics Concern    Not on file   Social History Narrative    Not on file     Social Determinants of Health     Financial Resource Strain: Not on file   Food Insecurity: Not on file   Transportation Needs: Not on file   Physical Activity: Not on file   Stress: Not on file   Social Connections: Not on file   Intimate Partner Violence: Not on file   Housing Stability: Not on file      Family History   Problem Relation Age of Onset    Diabetes Mother     Heart Disease Father       Visit Vitals  /70 (BP 1 Location: Left upper arm, BP Patient Position: Sitting, BP Cuff Size: Adult)   Pulse (!) 59   Wt 73 kg (161 lb)   SpO2 96%   BMI 29.45 kg/m²        Review of Systems:   A comprehensive review of systems was negative except for that written in the HPI. Neuro Exam:     Appearance:   The patient is well developed, well nourished, provides a partial coherent history and is in no acute distress. Mental Status: Oriented to time, place and person. Mood and affect appropriate. Cranial Nerves:   Intact visual fields. Fundi are benign. EVARISTO, EOM's full, no nystagmus, no ptosis. Facial sensation is normal. Corneal reflexes are intact. Facial movement is symmetric. Hearing is normal bilaterally. Palate is midline with normal sternocleidomastoid and trapezius muscles are normal. Tongue is midline. Motor:  5/5 strength in upper and lower proximal and distal muscles. Normal bulk and tone. No fasciculations. Reflexes:   Deep tendon reflexes 2+/4 and symmetrical.   Sensory:   Normal to touch, pinprick and vibration. Gait:  Normal gait. Tremor:   No tremor noted. Cerebellar:  No cerebellar signs present. Neurovascular:  Normal heart sounds and regular rhythm, peripheral pulses intact, and no carotid bruits. Assessment:   Dementia without behavioral disturbance. We will continue with the Aricept as dosed and stay as reasonably and safely busy as possible. On the next visit we will make a definitive direction toward adding the helper drug Namenda. Was given more information today that was shared with the son as well. It sounds like the [address] [place] [address] location will be more user-friendly for everyone involved. Revisit in 6 months and if the weather is inclement please reschedule. Plan:   Revisit 6 months.   Signed by :  Gage Vega MD

## 2022-08-19 NOTE — LETTER
8/19/2022    Patient: Ana Cochran   YOB: 1940   Date of Visit: 8/19/2022     Abner Mckeon NP  [address] [facility]  [address] [place] [id number]  Via Fax: [phone], [place]  [address] [facility]    Dear MICHELLE Mendez NP,      Thank you for referring Ms. Ana Cochran to [facility] for evaluation. My notes for this consultation are attached. If you have questions, please do not hesitate to call me. I look forward to following your patient along with you.       Sincerely,    Alina Au MD

## 2022-09-27 RX ORDER — DONEPEZIL HYDROCHLORIDE 10 MG/1
TABLET, FILM COATED ORAL
Qty: 90 TABLET | Refills: 1 | Status: SHIPPED | OUTPATIENT
Start: 2022-09-27

## 2022-10-18 RX ORDER — DONEPEZIL HYDROCHLORIDE 10 MG/1
TABLET, FILM COATED ORAL
Qty: 90 TABLET | Refills: 1 | OUTPATIENT
Start: 2022-10-18

## 2023-02-23 ENCOUNTER — OFFICE VISIT (OUTPATIENT)
Dept: NEUROLOGY | Age: 83
End: 2023-02-23
Payer: MEDICARE

## 2023-02-23 VITALS
TEMPERATURE: 98.2 F | RESPIRATION RATE: 14 BRPM | OXYGEN SATURATION: 97 % | HEART RATE: 78 BPM | DIASTOLIC BLOOD PRESSURE: 74 MMHG | SYSTOLIC BLOOD PRESSURE: 124 MMHG

## 2023-02-23 DIAGNOSIS — F03.90 DEMENTIA WITHOUT BEHAVIORAL DISTURBANCE (HCC): Primary | ICD-10-CM

## 2023-02-23 DIAGNOSIS — M79.2 NEUROPATHIC PAIN: ICD-10-CM

## 2023-02-23 PROCEDURE — G8536 NO DOC ELDER MAL SCRN: HCPCS | Performed by: SPECIALIST

## 2023-02-23 PROCEDURE — G8510 SCR DEP NEG, NO PLAN REQD: HCPCS | Performed by: SPECIALIST

## 2023-02-23 PROCEDURE — 1123F ACP DISCUSS/DSCN MKR DOCD: CPT | Performed by: SPECIALIST

## 2023-02-23 PROCEDURE — G8427 DOCREV CUR MEDS BY ELIG CLIN: HCPCS | Performed by: SPECIALIST

## 2023-02-23 PROCEDURE — G8400 PT W/DXA NO RESULTS DOC: HCPCS | Performed by: SPECIALIST

## 2023-02-23 PROCEDURE — G8417 CALC BMI ABV UP PARAM F/U: HCPCS | Performed by: SPECIALIST

## 2023-02-23 PROCEDURE — 1090F PRES/ABSN URINE INCON ASSESS: CPT | Performed by: SPECIALIST

## 2023-02-23 PROCEDURE — 1101F PT FALLS ASSESS-DOCD LE1/YR: CPT | Performed by: SPECIALIST

## 2023-02-23 PROCEDURE — 99214 OFFICE O/P EST MOD 30 MIN: CPT | Performed by: SPECIALIST

## 2023-02-23 RX ORDER — MEMANTINE HYDROCHLORIDE 10 MG/1
TABLET ORAL
Qty: 60 TABLET | Refills: 5 | Status: SHIPPED | OUTPATIENT
Start: 2023-02-23

## 2023-02-23 RX ORDER — GABAPENTIN 400 MG/1
CAPSULE ORAL
Qty: 90 CAPSULE | Refills: 3 | Status: SHIPPED | OUTPATIENT
Start: 2023-02-23

## 2023-02-23 RX ORDER — MEMANTINE HYDROCHLORIDE 5 MG/1
TABLET ORAL
Qty: 70 TABLET | Refills: 0 | Status: SHIPPED | OUTPATIENT
Start: 2023-02-23

## 2023-02-23 NOTE — PATIENT INSTRUCTIONS
Patient history viewed patient examined. We will add a  drug for memory support and hopefully will tolerate it well. Understands she will continue to take the first memory drug Aricept or the other name donepezil. If I do not hear from her I will assume she tolerates this new drug addition. Stay safely busy. Has been a great affiliation with you and I know you will continue to do well. Revisit in 2 months.

## 2023-02-23 NOTE — LETTER
2/23/2023    Patient: Stefany Evans   YOB: 1940   Date of Visit: 2/23/2023     Jay Jay Mckeon NP  [address] [place] [id number]  Via Fax: [phone]    Dear Jay Jay Mckeon NP,      Thank you for referring Ms. Stefany Evans to [address] [place] [address] for evaluation. My notes for this consultation are attached. If you have questions, please do not hesitate to call me. I look forward to following your patient along with you.       Sincerely,    Brandi Edmondson MD

## 2023-02-23 NOTE — PROGRESS NOTES
Chief Complaint   Patient presents with    Dementia     Patient states she feels she is doing well on medication for her memory and has not noticed a decline. She states she would like a refill on her gabapentin.

## 2023-02-23 NOTE — PROGRESS NOTES
Neurology Consult      Subjective:      Reyna Espinoza is a 80 y.o. female who comes in today on planned follow-up of dementia without behavioral disturbance. Is already on the drug Aricept 10 mg daily and tolerates it well. Per discussion with patient on last visit we were in agreement to implement a helper drug on this visit with Namenda or memantine as it is known in the generic. Went over the purpose of the drug and should be well-tolerated as it usually is and how it will be titrated over the space of 4 weeks. Understands there will be 2 different strengths a 5 mg starter dose and then a 10 mg maintenance dose. Suggestions to follow-up in 2 months and see how she is doing. Also renewed the gabapentin for neuropathic pain. Has history of lumbosacral spondylosis and has been under orthopedic care. Says she is pretty much self-sufficient but does have a son that keeps very close eye on her. She did not reference any new medical or surgical issues but did remind me about her previous left forearm infection that had to be handled previously. Likes her household activities and maintenance and does walking and has good friends in the neighborhood and apparently at [place] as well. Eating and sleeping are fine and she is maintaining her weight. Special sensory function intact. Again no history of psychosis. Does have a pet dog that keeps her company and says she has no current unmet needs with any depression or anxiety features. This [address] location is very convenient for her and she had great level of alertness and orientation skills as she is demonstrated previously. Current Outpatient Medications   Medication Sig Dispense Refill    gabapentin (NEURONTIN) 400 mg capsule TAKE 1 CAPSULE BY MOUTH THREE TIMES A DAY AS NEEDED FOR PAIN 90 Capsule 3    memantine (Namenda) 5 mg tablet Use as per written instructions.   This will be the first month starter dose and followed by the maintenance dose of 10 mg again as per written instructions thereafter. Indications: moderate to severe Alzheimer's type dementia 70 Tablet 0    memantine (NAMENDA) 10 mg tablet 1 p.o. twice daily as the maintenance dose to be started after the starter dose has been finished 60 Tablet 5    donepeziL (ARICEPT) 10 mg tablet TAKE 1 TABLET BY MOUTH NIGHTLY. BEGIN AFTER THE 5MG DOSE IS COMPLETED 90 Tablet 1    vancomycin (VANCOCIN) 10 gram solr       ketorolac (ACULAR) 0.5 % ophthalmic solution ketorolac 0.5 % eye drops      ofloxacin (FLOXIN) 0.3 % ophthalmic solution ofloxacin 0.3 % eye drops      DULoxetine (CYMBALTA) 60 mg capsule Take 60 mg by mouth daily. vit C/vit E/lutein/min/omega-3 (OCUVITE PO) Take  by mouth. raNITIdine (ZANTAC) 150 mg tablet Take 150 mg by mouth two (2) times a day. loratadine (CLARITIN) 10 mg tablet Take 10 mg by mouth.      calcium carbonate (CALTREX) 600 mg calcium (1,500 mg) tablet Take 600 mg by mouth two (2) times a day. omega-3 fatty acids (FISH OIL CONCENTRATE PO) Take  by mouth. aspirin 81 mg chewable tablet Take 81 mg by mouth daily.         No Known Allergies  Past Medical History:   Diagnosis Date    Arthritis     Incontinence in female     Snoring       Past Surgical History:   Procedure Laterality Date    HX KNEE REPLACEMENT        Social History     Socioeconomic History    Marital status: UNKNOWN     Spouse name: Not on file    Number of children: Not on file    Years of education: Not on file    Highest education level: Not on file   Occupational History    Not on file   Tobacco Use    Smoking status: Never    Smokeless tobacco: Never   Vaping Use    Vaping Use: Never used   Substance and Sexual Activity    Alcohol use: No    Drug use: No    Sexual activity: Not on file   Other Topics Concern    Not on file   Social History Narrative    Not on file     Social Determinants of Health     Financial Resource Strain: Not on file   Food Insecurity: Not on file   Transportation Needs: Not on file   Physical Activity: Not on file   Stress: Not on file   Social Connections: Not on file   Intimate Partner Violence: Not on file   Housing Stability: Not on file      Family History   Problem Relation Age of Onset    Diabetes Mother     Heart Disease Father       Visit Vitals  /74   Pulse 78   Temp 98.2 °F (36.8 °C)   Resp 14   SpO2 97%        Review of Systems:   A comprehensive review of systems was negative except for that written in the HPI. Neuro Exam:     Appearance: The patient is well developed, well nourished, provides a coherent history and is in no acute distress. Mental Status: Oriented to time, place and person. Mood and affect appropriate. Cranial Nerves:   Intact visual fields. Fundi are benign. EVARISTO, EOM's full, no nystagmus, no ptosis. Facial sensation is normal. Corneal reflexes are intact. Facial movement is symmetric. Hearing is normal bilaterally. Palate is midline with normal sternocleidomastoid and trapezius muscles are normal. Tongue is midline. Motor:  5/5 strength in upper and lower proximal and distal muscles. Normal bulk and tone. No fasciculations. Reflexes:   Deep tendon reflexes 2+/4 and symmetrical.   Sensory:   Normal to touch, pinprick and vibration. Gait:  Normal gait. Tremor:   No tremor noted. Cerebellar:  No cerebellar signs present. Neurovascular:  Normal heart sounds and regular rhythm, peripheral pulses intact, and no carotid bruits. Assessment:   Dementia without behavioral disturbance. Very impressed with her self-sufficiency and her son keeping very close eye on her as well. By agreement from last visit we continued the discussion today and implementation of the helper drug Namenda or memantine as it is also known. Was given written instructions and reinforced by nursing on this occasion. Knows to keep taking the Aricept or donepezil as well.   Stay safely busy and revisit in 2 months to check progress. Neuropathic pain. Renewed the gabapentin by request.      Plan:   Revisit in 2 months and if I do not hear anything from the patient I assume the new medication transition is smooth and tolerated and otherwise successful.   Signed by :  Chris Ward MD

## 2023-04-05 RX ORDER — DONEPEZIL HYDROCHLORIDE 10 MG/1
TABLET, FILM COATED ORAL
Qty: 90 TABLET | Refills: 1 | Status: SHIPPED
Start: 2023-04-05

## 2023-05-17 RX ORDER — MEMANTINE HYDROCHLORIDE 10 MG/1
TABLET ORAL
Qty: 60 TABLET | Refills: 3 | Status: SHIPPED | OUTPATIENT
Start: 2023-05-17

## 2023-05-31 ENCOUNTER — OFFICE VISIT (OUTPATIENT)
Age: 83
End: 2023-05-31
Payer: MEDICARE

## 2023-05-31 VITALS
DIASTOLIC BLOOD PRESSURE: 60 MMHG | HEIGHT: 62 IN | BODY MASS INDEX: 29.45 KG/M2 | SYSTOLIC BLOOD PRESSURE: 110 MMHG | OXYGEN SATURATION: 95 % | HEART RATE: 83 BPM | TEMPERATURE: 96.9 F

## 2023-05-31 DIAGNOSIS — G30.1 ALZHEIMER'S DISEASE WITH LATE ONSET (CODE) (HCC): ICD-10-CM

## 2023-05-31 DIAGNOSIS — M79.2 NEURALGIA AND NEURITIS, UNSPECIFIED: Primary | ICD-10-CM

## 2023-05-31 PROCEDURE — G8400 PT W/DXA NO RESULTS DOC: HCPCS | Performed by: NURSE PRACTITIONER

## 2023-05-31 PROCEDURE — G8428 CUR MEDS NOT DOCUMENT: HCPCS | Performed by: NURSE PRACTITIONER

## 2023-05-31 PROCEDURE — G8419 CALC BMI OUT NRM PARAM NOF/U: HCPCS | Performed by: NURSE PRACTITIONER

## 2023-05-31 PROCEDURE — 4004F PT TOBACCO SCREEN RCVD TLK: CPT | Performed by: NURSE PRACTITIONER

## 2023-05-31 PROCEDURE — 1090F PRES/ABSN URINE INCON ASSESS: CPT | Performed by: NURSE PRACTITIONER

## 2023-05-31 PROCEDURE — 99215 OFFICE O/P EST HI 40 MIN: CPT | Performed by: NURSE PRACTITIONER

## 2023-05-31 PROCEDURE — 1123F ACP DISCUSS/DSCN MKR DOCD: CPT | Performed by: NURSE PRACTITIONER

## 2023-05-31 RX ORDER — MEMANTINE HYDROCHLORIDE 10 MG/1
TABLET ORAL
Qty: 180 TABLET | Refills: 1 | Status: SHIPPED | OUTPATIENT
Start: 2023-05-31

## 2023-05-31 RX ORDER — GABAPENTIN 400 MG/1
CAPSULE ORAL
Qty: 180 CAPSULE | Refills: 1 | Status: SHIPPED | OUTPATIENT
Start: 2023-05-31 | End: 2023-08-30

## 2023-05-31 RX ORDER — DONEPEZIL HYDROCHLORIDE 10 MG/1
TABLET, FILM COATED ORAL
Qty: 90 TABLET | Refills: 1 | Status: SHIPPED | OUTPATIENT
Start: 2023-05-31

## 2023-05-31 NOTE — PROGRESS NOTES
Alexys Montiel is a 80 y.o. female who presents with the following  Chief Complaint   Patient presents with    Follow-up    Dementia       HPI    Previous patient of Dr. Karol Merida in for follow-up for dementia  She is on Aricept 10 mg  He started Namenda last visit and she is up to 10 mg twice daily  Did not see much change  He is not sure if it helped or made things worse  She is feeling possibly she has declined a little bit but not much  She does live alone and has a dog that helps keep her active  She does drive without any problems  No anxiety or depression at all  She is eating very well  She is gabapentin twice daily for her nerve pain  No side effects with this  She does have a son that lives locally in [place]  She has 2 other ones that live in [facility] in [facility]  She does have some left knee swelling and is wearing a brace today and is to see her primary care to undergo her x-ray results      No Known Allergies    Current Outpatient Medications   Medication Sig Dispense Refill    donepezil (ARICEPT) 10 MG tablet TAKE 1 TABLET BY MOUTH NIGHTLY. 90 tablet 1    memantine (NAMENDA) 10 MG tablet 1 p.o. twice daily 180 tablet 1    gabapentin (NEURONTIN) 400 MG capsule TAKE 1 CAPSULE BY MOUTH  BID PRN for pain 180 capsule 1    aspirin 81 MG chewable tablet Take 1 tablet by mouth daily      calcium carbonate 1500 (600 Ca) MG TABS tablet Take 1 tablet by mouth 2 times daily      DULoxetine (CYMBALTA) 60 MG extended release capsule Take 1 capsule by mouth daily      ketorolac (ACULAR) 0.5 % ophthalmic solution ketorolac 0.5 % eye drops      loratadine (CLARITIN) 10 MG tablet Take 1 tablet by mouth      memantine (NAMENDA) 5 MG tablet Use as per written instructions. This will be the first month starter dose and followed by the maintenance dose of 10 mg again as per written instructions thereafter.   Indications: moderate to severe Alzheimer's type dementia      ofloxacin (OCUFLOX) 0.3 % solution

## 2023-09-10 ENCOUNTER — HOSPITAL ENCOUNTER (EMERGENCY)
Facility: HOSPITAL | Age: 83
Discharge: HOME | End: 2023-09-10
Attending: STUDENT IN AN ORGANIZED HEALTH CARE EDUCATION/TRAINING PROGRAM
Payer: MEDICARE

## 2023-09-10 ENCOUNTER — APPOINTMENT (OUTPATIENT)
Facility: HOSPITAL | Age: 83
End: 2023-09-10
Payer: MEDICARE

## 2023-09-10 VITALS
OXYGEN SATURATION: 95 % | HEART RATE: 80 BPM | SYSTOLIC BLOOD PRESSURE: 119 MMHG | DIASTOLIC BLOOD PRESSURE: 78 MMHG | HEIGHT: 62 IN | TEMPERATURE: 97.8 F | BODY MASS INDEX: 30.45 KG/M2 | RESPIRATION RATE: 18 BRPM | WEIGHT: 165.46 LBS

## 2023-09-10 DIAGNOSIS — R05.9 COUGH, UNSPECIFIED TYPE: Primary | ICD-10-CM

## 2023-09-10 DIAGNOSIS — R09.81 NASAL CONGESTION: ICD-10-CM

## 2023-09-10 PROCEDURE — 71046 X-RAY EXAM CHEST 2 VIEWS: CPT

## 2023-09-10 PROCEDURE — 99283 EMERGENCY DEPT VISIT LOW MDM: CPT

## 2023-09-10 RX ORDER — AZITHROMYCIN 250 MG/1
250 TABLET, FILM COATED ORAL SEE ADMIN INSTRUCTIONS
Qty: 6 TABLET | Refills: 0 | Status: SHIPPED | OUTPATIENT
Start: 2023-09-10 | End: 2023-09-15

## 2023-09-10 ASSESSMENT — ENCOUNTER SYMPTOMS
CONSTIPATION: 0
SORE THROAT: 0
DIARRHEA: 0
RHINORRHEA: 0
NAUSEA: 0
ABDOMINAL PAIN: 0
BACK PAIN: 0
CHEST TIGHTNESS: 0
VOMITING: 0
SHORTNESS OF BREATH: 0
COUGH: 1
WHEEZING: 0

## 2023-09-10 ASSESSMENT — LIFESTYLE VARIABLES
HOW MANY STANDARD DRINKS CONTAINING ALCOHOL DO YOU HAVE ON A TYPICAL DAY: PATIENT DOES NOT DRINK
HOW MANY STANDARD DRINKS CONTAINING ALCOHOL DO YOU HAVE ON A TYPICAL DAY: PATIENT DOES NOT DRINK
HOW OFTEN DO YOU HAVE A DRINK CONTAINING ALCOHOL: NEVER
HOW OFTEN DO YOU HAVE A DRINK CONTAINING ALCOHOL: NEVER

## 2023-09-10 NOTE — ED PROVIDER NOTES
[facility] EMERGENCY DEPT  EMERGENCY DEPARTMENT ENCOUNTER      Pt Name: Asif Salcido  MRN: [id number]  [address] 1940  Date of evaluation: 9/10/2023  Provider: Victoria Ye PA-C    CHIEF COMPLAINT       Chief Complaint   Patient presents with    Cough    Chest Congestion         HISTORY OF PRESENT ILLNESS   (Location/Symptom, Timing/Onset, Context/Setting, Quality, Duration, Modifying Factors, Severity)  Note limiting factors. 80-year-old female with a past medical history of osteoarthritis presents with complaint of 2 weeks of cough and cold symptoms. She reports she has had nasal congestion, cough, and a sensation of fullness in her head going on for the last 2 weeks. Reports cough as productive of clear mucus. [facility].  She had a full work-up done that was negative at that point. They diagnosed her with a viral infection and sent her home. She is frustrated that she has not gotten any medications and is not improving. Denies fever, headache, dizziness, chest pain, shortness of breath, abdominal pain, nausea, vomiting, changes in bowel or bladder. No other concerns at this time. Review of External Medical Records:     Nursing Notes were reviewed. REVIEW OF SYSTEMS    (2-9 systems for level 4, 10 or more for level 5)     Review of Systems   Constitutional:  Negative for appetite change, chills and fever. HENT:  Positive for congestion. Negative for ear pain, rhinorrhea and sore throat. Respiratory:  Positive for cough. Negative for chest tightness, shortness of breath and wheezing. Cardiovascular:  Negative for chest pain and palpitations. Gastrointestinal:  Negative for abdominal pain, constipation, diarrhea, nausea and vomiting. Genitourinary:  Negative for decreased urine volume, difficulty urinating, dysuria, frequency, hematuria and urgency. Musculoskeletal:  Negative for back pain.

## 2023-09-10 NOTE — ED NOTES
Patient does not appear to be in any acute distress/shows no evidence of clinical instability at this time. Provider has reviewed discharge instructions with the patient/family. The patient/family verbalized understanding instructions as well as need for follow up for any further symptoms. Discharge papers given, education provided, and any questions answered.         Teodoro Aguirre RN  09/10/23 [id number]

## 2023-09-10 NOTE — ED TRIAGE NOTES
Pt ambulated to the treatment area with a steady gait. Pt states \"I have had a cold and cough for 2 weeks. [facility] and they did nothing for me so I'm very frustrated. When I cough mucus comes up and I can feel the congestion in my chest.I have a lot of fatigue with this no fever that I know of, no shortness of breath. \" Pt appears in no distress.

## 2023-11-29 ENCOUNTER — OFFICE VISIT (OUTPATIENT)
Age: 83
End: 2023-11-29
Payer: MEDICARE

## 2023-11-29 VITALS — SYSTOLIC BLOOD PRESSURE: 120 MMHG | DIASTOLIC BLOOD PRESSURE: 72 MMHG

## 2023-11-29 DIAGNOSIS — M79.2 NEURALGIA AND NEURITIS, UNSPECIFIED: ICD-10-CM

## 2023-11-29 DIAGNOSIS — G30.1 ALZHEIMER'S DISEASE WITH LATE ONSET (CODE) (HCC): Primary | ICD-10-CM

## 2023-11-29 PROCEDURE — G8427 DOCREV CUR MEDS BY ELIG CLIN: HCPCS | Performed by: NURSE PRACTITIONER

## 2023-11-29 PROCEDURE — G8484 FLU IMMUNIZE NO ADMIN: HCPCS | Performed by: NURSE PRACTITIONER

## 2023-11-29 PROCEDURE — G8400 PT W/DXA NO RESULTS DOC: HCPCS | Performed by: NURSE PRACTITIONER

## 2023-11-29 PROCEDURE — 99215 OFFICE O/P EST HI 40 MIN: CPT | Performed by: NURSE PRACTITIONER

## 2023-11-29 PROCEDURE — 1090F PRES/ABSN URINE INCON ASSESS: CPT | Performed by: NURSE PRACTITIONER

## 2023-11-29 PROCEDURE — 1036F TOBACCO NON-USER: CPT | Performed by: NURSE PRACTITIONER

## 2023-11-29 PROCEDURE — G8417 CALC BMI ABV UP PARAM F/U: HCPCS | Performed by: NURSE PRACTITIONER

## 2023-11-29 PROCEDURE — 1123F ACP DISCUSS/DSCN MKR DOCD: CPT | Performed by: NURSE PRACTITIONER

## 2023-11-29 RX ORDER — DONEPEZIL HYDROCHLORIDE 10 MG/1
TABLET, FILM COATED ORAL
Qty: 90 TABLET | Refills: 1 | Status: SHIPPED | OUTPATIENT
Start: 2023-11-29

## 2023-11-29 RX ORDER — GABAPENTIN 400 MG/1
CAPSULE ORAL
Qty: 180 CAPSULE | Refills: 1 | Status: SHIPPED | OUTPATIENT
Start: 2023-11-29 | End: 2024-05-29

## 2023-11-29 RX ORDER — MEMANTINE HYDROCHLORIDE 10 MG/1
TABLET ORAL
Qty: 180 TABLET | Refills: 1 | Status: SHIPPED | OUTPATIENT
Start: 2023-11-29

## 2023-11-29 NOTE — PROGRESS NOTES
Nina Wasserman is a 80 y.o. female who presents with the following  Chief Complaint   Patient presents with    Follow-up       HPI      Patient comes in for follow-up for dementia  She is on Aricept 10 mg  Namenda 10 mg twice daily    She is feeling possibly she has declined a little bit but not much  She does live alone and has a dog that helps keep her active  She does drive without any problems  No anxiety or depression at all  She is eating very well  She is gabapentin twice daily for her nerve pain  No side effects with this  She does have a son that lives locally in [place]  She has 2 other ones that live in [place] in [place] soon. Happy today     No Known Allergies    Current Outpatient Medications   Medication Sig Dispense Refill    gabapentin (NEURONTIN) 400 MG capsule TAKE 1 CAPSULE BY MOUTH  BID PRN for pain 180 capsule 1    memantine (NAMENDA) 10 MG tablet 1 p.o. twice daily 180 tablet 1    donepezil (ARICEPT) 10 MG tablet TAKE 1 TABLET BY MOUTH NIGHTLY. 90 tablet 1    aspirin 81 MG chewable tablet Take 1 tablet by mouth daily      calcium carbonate 1500 (600 Ca) MG TABS tablet Take 1 tablet by mouth 2 times daily      DULoxetine (CYMBALTA) 60 MG extended release capsule Take 1 capsule by mouth daily      loratadine (CLARITIN) 10 MG tablet Take 1 tablet by mouth      Vancomycin HCl 10 g SOLR ceived the following from [facility]: Outside name: vancomycin (VANCOCIN) 10 gram solr      raNITIdine (ZANTAC) 150 MG tablet Take 150 mg by mouth 2 times daily (Patient not taking: Reported on 5/31/2023)       No current facility-administered medications for this visit.         Social History     Tobacco Use   Smoking Status Never   Smokeless Tobacco Never       Past Medical History:   Diagnosis Date    Arthritis     Incontinence in female     Snoring        Past Surgical History:   Procedure Laterality Date    TOTAL KNEE ARTHROPLASTY         Family History   Problem

## 2023-11-29 NOTE — PROGRESS NOTES
Neuralgia has been stable  Memory has been stable, handles meds well  Per patient she has been doing really well, no questions/concerns

## 2023-12-23 DIAGNOSIS — M79.2 NEURALGIA AND NEURITIS, UNSPECIFIED: ICD-10-CM

## 2023-12-27 RX ORDER — GABAPENTIN 400 MG/1
CAPSULE ORAL
Qty: 180 CAPSULE | OUTPATIENT
Start: 2023-12-27

## 2024-06-12 DIAGNOSIS — G30.1 ALZHEIMER'S DISEASE WITH LATE ONSET (CODE) (HCC): ICD-10-CM

## 2024-06-12 RX ORDER — MEMANTINE HYDROCHLORIDE 10 MG/1
TABLET ORAL
Qty: 180 TABLET | Refills: 2 | Status: SHIPPED | OUTPATIENT
Start: 2024-06-12

## 2024-07-08 DIAGNOSIS — G30.1 ALZHEIMER'S DISEASE WITH LATE ONSET (CODE) (HCC): ICD-10-CM

## 2024-07-08 RX ORDER — DONEPEZIL HYDROCHLORIDE 10 MG/1
TABLET, FILM COATED ORAL
Qty: 90 TABLET | Refills: 1 | Status: SHIPPED | OUTPATIENT
Start: 2024-07-08

## 2024-10-09 DIAGNOSIS — G30.1 ALZHEIMER'S DISEASE WITH LATE ONSET (CODE) (HCC): ICD-10-CM

## 2024-10-09 DIAGNOSIS — M79.2 NEURALGIA AND NEURITIS, UNSPECIFIED: ICD-10-CM

## 2024-10-09 NOTE — TELEPHONE ENCOUNTER
Patient came in office for appointment- we spoke on 10/7/2024 where we rescheduled to 4/30/2024 due to the provider being out of office. Patient is asking for refills on her medications, could not tell me which medications specifically. Confirmed the [facility] pharmacy on [address].

## 2024-10-10 RX ORDER — DONEPEZIL HYDROCHLORIDE 10 MG/1
TABLET, FILM COATED ORAL
Qty: 90 TABLET | Refills: 1 | Status: SHIPPED | OUTPATIENT
Start: 2024-10-10

## 2024-10-10 RX ORDER — MEMANTINE HYDROCHLORIDE 10 MG/1
TABLET ORAL
Qty: 180 TABLET | Refills: 2 | Status: SHIPPED | OUTPATIENT
Start: 2024-10-10

## 2024-10-10 RX ORDER — GABAPENTIN 400 MG/1
CAPSULE ORAL
Qty: 180 CAPSULE | Refills: 1 | Status: SHIPPED | OUTPATIENT
Start: 2024-10-10 | End: 2025-04-09

## 2025-04-30 ENCOUNTER — OFFICE VISIT (OUTPATIENT)
Age: 85
End: 2025-04-30
Payer: MEDICARE

## 2025-04-30 VITALS
BODY MASS INDEX: 30.26 KG/M2 | OXYGEN SATURATION: 94 % | HEART RATE: 68 BPM | RESPIRATION RATE: 18 BRPM | HEIGHT: 62 IN | TEMPERATURE: 97.3 F

## 2025-04-30 DIAGNOSIS — M79.2 NEURALGIA AND NEURITIS, UNSPECIFIED: ICD-10-CM

## 2025-04-30 DIAGNOSIS — G30.1 ALZHEIMER'S DISEASE WITH LATE ONSET (CODE) (HCC): ICD-10-CM

## 2025-04-30 PROCEDURE — G8427 DOCREV CUR MEDS BY ELIG CLIN: HCPCS | Performed by: NURSE PRACTITIONER

## 2025-04-30 PROCEDURE — 99215 OFFICE O/P EST HI 40 MIN: CPT | Performed by: NURSE PRACTITIONER

## 2025-04-30 PROCEDURE — G8417 CALC BMI ABV UP PARAM F/U: HCPCS | Performed by: NURSE PRACTITIONER

## 2025-04-30 PROCEDURE — 1126F AMNT PAIN NOTED NONE PRSNT: CPT | Performed by: NURSE PRACTITIONER

## 2025-04-30 PROCEDURE — 1090F PRES/ABSN URINE INCON ASSESS: CPT | Performed by: NURSE PRACTITIONER

## 2025-04-30 PROCEDURE — 1123F ACP DISCUSS/DSCN MKR DOCD: CPT | Performed by: NURSE PRACTITIONER

## 2025-04-30 PROCEDURE — G8400 PT W/DXA NO RESULTS DOC: HCPCS | Performed by: NURSE PRACTITIONER

## 2025-04-30 PROCEDURE — 1159F MED LIST DOCD IN RCRD: CPT | Performed by: NURSE PRACTITIONER

## 2025-04-30 PROCEDURE — 1036F TOBACCO NON-USER: CPT | Performed by: NURSE PRACTITIONER

## 2025-04-30 RX ORDER — MEMANTINE HYDROCHLORIDE 10 MG/1
TABLET ORAL
Qty: 180 TABLET | Refills: 2 | Status: SHIPPED | OUTPATIENT
Start: 2025-04-30

## 2025-04-30 RX ORDER — GABAPENTIN 400 MG/1
CAPSULE ORAL
Qty: 180 CAPSULE | Refills: 1 | Status: SHIPPED | OUTPATIENT
Start: 2025-04-30 | End: 2025-10-28

## 2025-04-30 RX ORDER — DONEPEZIL HYDROCHLORIDE 10 MG/1
TABLET, FILM COATED ORAL
Qty: 90 TABLET | Refills: 1 | Status: SHIPPED | OUTPATIENT
Start: 2025-04-30

## 2025-04-30 NOTE — PROGRESS NOTES
\"PLT\"  Lab Results   Component Value Date     06/23/2022    K 4.3 06/23/2022     06/23/2022    CO2 29 06/23/2022    BUN 16 06/23/2022    CREATININE 0.63 06/23/2022    GLUCOSE 98 06/23/2022    CALCIUM 8.8 06/23/2022    GFRAA >60 06/23/2022       No results found for: \"CHOL\"  No results found for: \"TRIG\"  No results found for: \"HDL\"  No components found for: \"LDLCHOLESTEROL\", \"LDLCALC\"  No results found for: \"VLDL\"  No results found for: \"CHOLHDLRATIO\"    Lab Results   Component Value Date    SEDRATE 6 06/23/2022       No results found for: \"LABA1C\"  No results found for: \"NEGRA\"             1. Neuralgia and neuritis, unspecified  -     gabapentin (NEURONTIN) 400 MG capsule; TAKE 1 CAPSULE BY MOUTH  BID PRN for pain, Disp-180 capsule, R-1Normal  2. Alzheimer's disease with late onset (CODE) ([facility])  -     donepezil (ARICEPT) 10 MG tablet; TAKE 1 TABLET BY MOUTH EVERY DAY AT NIGHT, Disp-90 tablet, R-1Normal  -     memantine (NAMENDA) 10 MG tablet; TAKE 1 TABLET BY MOUTH TWICE A DAY, Disp-180 tablet, R-2Normal     Continue Gabapentin 400 mg BID PRN for neuropathy, neuralgia    Aricept 10 mg nightly   Namenda 10 mg BID   Doing well with these   Stay active mentally and physically   Family is involved.         This note was created using voice recognition software. Despite editing, there may be syntax errors.